# Patient Record
Sex: FEMALE | Race: NATIVE HAWAIIAN OR OTHER PACIFIC ISLANDER
[De-identification: names, ages, dates, MRNs, and addresses within clinical notes are randomized per-mention and may not be internally consistent; named-entity substitution may affect disease eponyms.]

---

## 2018-07-15 ENCOUNTER — HOSPITAL ENCOUNTER (INPATIENT)
Dept: HOSPITAL 31 - C.ER | Age: 48
LOS: 8 days | Discharge: HOME | DRG: 181 | End: 2018-07-23
Attending: FAMILY MEDICINE | Admitting: FAMILY MEDICINE
Payer: COMMERCIAL

## 2018-07-15 VITALS — BODY MASS INDEX: 19 KG/M2

## 2018-07-15 DIAGNOSIS — D47.3: ICD-10-CM

## 2018-07-15 DIAGNOSIS — G47.9: ICD-10-CM

## 2018-07-15 DIAGNOSIS — Z17.1: ICD-10-CM

## 2018-07-15 DIAGNOSIS — R53.83: ICD-10-CM

## 2018-07-15 DIAGNOSIS — R09.02: ICD-10-CM

## 2018-07-15 DIAGNOSIS — Z80.41: ICD-10-CM

## 2018-07-15 DIAGNOSIS — C50.919: ICD-10-CM

## 2018-07-15 DIAGNOSIS — R00.0: ICD-10-CM

## 2018-07-15 DIAGNOSIS — C78.00: ICD-10-CM

## 2018-07-15 DIAGNOSIS — J91.0: Primary | ICD-10-CM

## 2018-07-15 LAB
ALBUMIN SERPL-MCNC: 4.1 G/DL (ref 3.5–5)
ALBUMIN/GLOB SERPL: 1.1 {RATIO} (ref 1–2.1)
ALT SERPL-CCNC: 35 U/L (ref 9–52)
APTT BLD: 28 SECONDS (ref 21–34)
ARTERIAL BLOOD GAS O2 SAT: 97.5 % (ref 95–98)
ARTERIAL BLOOD GAS PCO2: 47 MM/HG (ref 35–45)
ARTERIAL BLOOD GAS TCO2: 34.8 MMOL/L (ref 22–28)
ARTERIAL PATENCY WRIST A: (no result)
AST SERPL-CCNC: 46 U/L (ref 14–36)
BASOPHILS # BLD AUTO: 0.1 K/UL (ref 0–0.2)
BASOPHILS NFR BLD: 1.2 % (ref 0–2)
BNP SERPL-MCNC: 1960 PG/ML (ref 0–450)
BUN SERPL-MCNC: 3 MG/DL (ref 7–17)
CALCIUM SERPL-MCNC: 9.3 MG/DL (ref 8.6–10.4)
EOSINOPHIL # BLD AUTO: 0.6 K/UL (ref 0–0.7)
EOSINOPHIL NFR BLD: 7.5 % (ref 0–4)
ERYTHROCYTE [DISTWIDTH] IN BLOOD BY AUTOMATED COUNT: 13.1 % (ref 11.5–14.5)
GFR NON-AFRICAN AMERICAN: > 60
HCO3 BLDA-SCNC: 31.4 MMOL/L (ref 21–28)
HGB BLD-MCNC: 13 G/DL (ref 11–16)
INR PPP: 1.1
LYMPHOCYTES # BLD AUTO: 1.5 K/UL (ref 1–4.3)
LYMPHOCYTES NFR BLD AUTO: 18.7 % (ref 20–40)
MCH RBC QN AUTO: 29.1 PG (ref 27–31)
MCHC RBC AUTO-ENTMCNC: 33.9 G/DL (ref 33–37)
MCV RBC AUTO: 85.8 FL (ref 81–99)
MONOCYTES # BLD: 0.7 K/UL (ref 0–0.8)
MONOCYTES NFR BLD: 9.5 % (ref 0–10)
NEUTROPHILS # BLD: 5 K/UL (ref 1.8–7)
NEUTROPHILS NFR BLD AUTO: 63.1 % (ref 50–75)
NRBC BLD AUTO-RTO: 0 % (ref 0–2)
PH BLDA: 7.46 [PH] (ref 7.35–7.45)
PLATELET # BLD: 608 K/UL (ref 130–400)
PMV BLD AUTO: 6.2 FL (ref 7.2–11.7)
PO2 BLDA: 79 MM/HG (ref 80–100)
PROTHROMBIN TIME: 12.2 SECONDS (ref 9.7–12.2)
RBC # BLD AUTO: 4.45 MIL/UL (ref 3.8–5.2)
WBC # BLD AUTO: 7.9 K/UL (ref 4.8–10.8)

## 2018-07-15 RX ADMIN — ALBUTEROL SULFATE SCH MG: 2.5 SOLUTION RESPIRATORY (INHALATION) at 20:24

## 2018-07-15 RX ADMIN — PROMETHAZINE HYDROCHLORIDE AND CODEINE PHOSPHATE PRN ML: 6.25; 1 SOLUTION ORAL at 23:39

## 2018-07-15 RX ADMIN — ENOXAPARIN SODIUM SCH MG: 30 INJECTION SUBCUTANEOUS at 22:23

## 2018-07-15 NOTE — CT
CT chest pulmonary angiogram 



History: Shortness of breath.  History of metastatic cancer. 



Comparison: None available. 



Technique: Multiple contiguous axial images were performed through 

the chest utilizing pulmonary embolism protocol with the use of 

intravenous contrast.  Subsequently, sagittal coronal reformatted 

images as well sagittal coronal MIPS reformatted images were 

obtained. 



This CT exam was performed using one or more of the following dose 

reduction techniques: Automated exposure control, adjustment of the 

mA and/or kV according to patient size, and/or use of iterative 

reconstruction technique. 



Findings: 



No evidence of acute pulmonary embolism. 



No evidence acute aortic dissection. 



Right lung: 



Moderate to large loculated right pleural effusion.  Associated 

lobulated pleural thickening circumferentially involving the right 

katlyn thorax as well as the fissures suggestive for pleural metastatic 

disease. Innumerable pulmonary masses throughout the right lung 

consistent with metastatic disease. For example at the right lung 

apex measuring 2.1 centimeters, right upper lobe measuring 8 

millimeters more inferiorly within the right upper lobe measuring 1.4 

centimeters. Additional pulmonary masses seen within the right middle 

and lower lobes. Focal consolidation and/or atelectasis within a 

large portion of right middle and lower lobes with some minimal 

aeration in the right lower lobe noted. 



Left lung: Small lobulated left pleural effusion with lobulated 

pleural thickening along the posterior aspect of the left lower lobe 

also concerning for pleural metastatic disease. Multiple 

pleural-based masses along the left katlyn thorax for example within 

the left upper lobe laterally measuring up to 1.7 centimeters 

concerning for metastatic disease. Multiple pulmonary masses some of 

which are pleural-based seen throughout the left lung consistent with 

known metastatic disease. Innumerable pulmonary nodules and smaller 

pulmonary masses also noted throughout the lungs. Prominent mass for 

example is noted at the left upper lobe anteriorly measuring 2.1 

centimeters on series 4, image 28 at the level of the prevascular 

space. There is tumor encasement of the left main pulmonary artery as 

well as encasement of the segmental vessels with tumor burden. 

Prominent consolidation and/or atelectasis of the inferior left upper 

lobe and lingula. Additional prominent consolidation in the posterior 

left lower lobe.  Multiple pulmonary mass is noted within the left 

lower lobe. 



Trachea thru central airways are patent. 



No significant axillary adenopathy. 



Heterogeneity of the thyroid. 



Left paratracheal lymphadenopathy measures 3.8 centimeters. Tumor 

encasement of the left main pulmonary artery and branch vessels. 

Right hilar adenopathy measures 1.5 centimeters. 



Multiple low-attenuation foci/lesions throughout the liver with 

intrahepatic biliary ductal dilatation.  Clinical correlation. 

Nodular thickening of the adrenal glands. 



Degenerative changes in the spine. 



Impression: 



No evidence of acute pulmonary embolism. 



Extensive metastatic disease within the lung fields as described 

above. 



Multiple areas of consolidation as described above in both lungs. 



Bilateral pleural effusions larger on the right. 



Prominent lymphadenopathy throughout the mediastinum with prominent 

tumor encasement of the left main pulmonary artery and branch 

vessels. 



Additional findings as above.

## 2018-07-15 NOTE — CP.PCM.HP
History of Present Illness





- History of Present Illness


History of Present Illness: 


> Pt had called emergently to be seen or prescribed abx for a cough that's been 

going on for about a month. Pt was coughing uncontrollably when seen. 

Auscultation showed wheezing in multiple places. Gave a trial of an inhaled 

steroid with a long acting beta-agonist. Pt felt relief in a few minutes, and 

had stopped coughing enough that she was able to tell her story. She had 

finished a bottle of Mucinex without relief in the last month. She had also 

been using her Ventolin which she had brought in at my request. I instructed pt 

on the correct way of using the inhaler, as she was not inhaling the medicine, 

which stayed in her mouth and subsequently released with the next exhalation. 


> Used the see 1, do 1 maxim using the inhaler, and pt felt immediate relief. 


> Because of the length of patient's illness, suggested that pt obtain a CXR to 

r/o pneumonia. Pt agreed to get it the same afternoon.


> Received a call the following day around lunchtime, pt's 2 view xray showed 

possible malignancy especially in the upper lobes of both lungs. Called pt's 

oncologist within a few minutes and advised him of the results of the CXR and 

forwarded the report to him the same day, 5/9/2018.


> Having discharged my task and turned her care to her oncologist, i was called 

in again to her case after she had a thoracentesis that resulted in severe pain 

for patient. Since I had received no updates from Oncology, plan was to try and 

stage while in patient. Unbeknownst to me, pt was scheduled for biopsy with IR 

the following day. No fasting schedule issued nor preliminary bloodwork 

ordered. When things were finally straightened outI had already gotten the gist 

of what was missing in the work up and proceeded to get it scheduled and done. 

Pathology came out 6 days later, and I obtained a copy of it for pt the 

following morning after she informed me the results were out. I scheduled also 

a face to face meeting the following Monday with patient and her family, where 

we discussed possible options for her treatment, which would probably be 

palliative at the most. Hence, pt is aware that her condition is without cure 

at the present time, and no protocol exists for treating it, as far as i can 

surmise. 


> This morning, I was called by the pt's daughter who reported that they couldn'

t get her O2 sat above 90, and I advised them to bring her to this facility for 

further management and so that things could be set up to provide for pt's 

comfort at home, as well as get a 2nd opinion on other outstanding issues that 

can hopefully make pt more cofortable in her last days. 





Present on Admission





- Present on Admission


Any Indicators Present on Admission: No


History of DVT/PE: No


History of Uncontrolled Diabetes: No


Urinary Catheter: No


Decubitus Ulcer Present: No





Review of Systems





- Review of Systems


Systems not reviewed;Unavailable: Respiratory Distress


Review of Systems: 





above and coughing





- Breasts


Breasts: Other


Additional comments: 





s/p mastectomy, R





- Respiratory


Respiratory: Cough, Dyspnea, Pain with Coughing, Other


Additional comments: 





blood tinged-sputum   





Past Patient History





- Past Medical History & Family History


Past Medical History?: Yes





- Past Social History


Smoking Status: Never Smoked





- PULMONARY


Hx Pulmonary Edema: Yes





- HEMATOLOGICAL/ONCOLOGICAL


Hx Cancer: Yes


Other/Comment: Breast CA and R lumpectomy/radiation





- MUSCULOSKELETAL/RHEUMATOLOGICAL


Hx Falls: No





- GASTROINTESTINAL


Hx Gall Bladder Disease: Yes


Other/Comment: Cholecystectomy





- GENITOURINARY/GYNECOLOGICAL


Other/Comment: Hysterectomy d/t endometriosis





- PSYCHIATRIC


Hx Substance Use: No





- SURGICAL HISTORY


Hx Appendectomy: Yes (in [facility])


Hx Cholecystectomy: Yes (in native [place])


Hx Hysterectomy: Yes


Other/Comment: R lumpectomy





- ANESTHESIA


Hx Anesthesia: Yes


Hx Anesthesia Reactions: No


Hx Malignant Hyperthermia: No


Has any member of the family had a problem w/ anesthesia?: No





Meds


Allergies/Adverse Reactions: 


 Allergies











Allergy/AdvReac Type Severity Reaction Status Date / Time


 


simvastatin Allergy Severe SWELLING Verified 07/15/18 12:00














Physical Exam





- Constitutional


Appears: In Acute Distress


Additional comments: 





speaking in phrases





- Head Exam


Head Exam: ATRAUMATIC, NORMAL INSPECTION, NORMOCEPHALIC





- Eye Exam


Eye Exam: EOMI, Normal appearance


Pupil Exam: NORMAL ACCOMODATION, PERRL





- ENT Exam


ENT Exam: absent: Mucous Membranes Dry, Mucous Membranes Moist, Normal Exam, 

Normal External Ear Exam, Normal Oropharynx, TM's Normal Bilaterally





- Neck Exam


Additional comments: 





+ JVD





- Respiratory Exam


Respiratory Exam: Prolonged Expiratory Phase, Wheezes, Respiratory Distress





- Cardiovascular Exam


Cardiovascular Exam: Tachycardia, REGULAR RHYTHM





- GI/Abdominal Exam


GI & Abdominal Exam: Normal Bowel Sounds





- Rectal Exam


Rectal Exam: Deferred





- Extremities Exam


Extremities exam: Positive for: normal inspection





- Back Exam


Back exam: NORMAL INSPECTION





- Neurological Exam


Neurological exam: Alert





- Psychiatric Exam


Psychiatric exam: Depressed, Normal Mood





Results





- Vital Signs


Recent Vital Signs: 





 Last Vital Signs











Temp  98.3 F   07/15/18 12:08


 


Pulse  113 H  07/15/18 20:24


 


Resp  22   07/15/18 18:32


 


BP  98/53 L  07/15/18 16:33


 


Pulse Ox  96   07/15/18 18:32














- Labs


Result Diagrams: 


 07/15/18 13:04





 07/15/18 13:04


Labs: 





 Laboratory Results - last 24 hr











  07/15/18 07/15/18 07/15/18





  13:04 13:04 13:04


 


WBC  7.9  


 


RBC  4.45  


 


Hgb  13.0  


 


Hct  38.2  


 


MCV  85.8  


 


MCH  29.1  


 


MCHC  33.9  


 


RDW  13.1  


 


Plt Count  608 H  


 


MPV  6.2 L  


 


Neut % (Auto)  63.1  


 


Lymph % (Auto)  18.7 L  


 


Mono % (Auto)  9.5  


 


Eos % (Auto)  7.5 H  


 


Baso % (Auto)  1.2  


 


Neut # (Auto)  5.0  


 


Lymph # (Auto)  1.5  


 


Mono # (Auto)  0.7  


 


Eos # (Auto)  0.6  


 


Baso # (Auto)  0.1  


 


PT   12.2 


 


INR   1.1 


 


APTT   28 


 


Puncture Site   


 


pCO2   


 


pO2   


 


HCO3   


 


ABG pH   


 


ABG Total CO2   


 


ABG O2 Saturation   


 


ABG Base Excess   


 


Suraj Test   


 


ABG Potassium   


 


Glucose   


 


Lactate   


 


Liter Flow   


 


Sodium    132


 


Potassium    4.5


 


Chloride    85 L


 


Carbon Dioxide    37 H


 


Anion Gap    14


 


BUN    3 L


 


Creatinine    0.4 L


 


Est GFR ( Amer)    > 60


 


Est GFR (Non-Af Amer)    > 60


 


Random Glucose    114 H


 


Calcium    9.3


 


Total Bilirubin    0.6


 


AST    46 H


 


ALT    35


 


Alkaline Phosphatase    129 H


 


Troponin I    < 0.0120


 


NT-Pro-B Natriuret Pep    1960 H


 


Total Protein    7.9


 


Albumin    4.1


 


Globulin    3.8


 


Albumin/Globulin Ratio    1.1


 


Arterial Blood Potassium   














  07/15/18





  13:57


 


WBC 


 


RBC 


 


Hgb 


 


Hct 


 


MCV 


 


MCH 


 


MCHC 


 


RDW 


 


Plt Count 


 


MPV 


 


Neut % (Auto) 


 


Lymph % (Auto) 


 


Mono % (Auto) 


 


Eos % (Auto) 


 


Baso % (Auto) 


 


Neut # (Auto) 


 


Lymph # (Auto) 


 


Mono # (Auto) 


 


Eos # (Auto) 


 


Baso # (Auto) 


 


PT 


 


INR 


 


APTT 


 


Puncture Site  Lra


 


pCO2  47 H


 


pO2  79 L


 


HCO3  31.4 H


 


ABG pH  7.46 H


 


ABG Total CO2  34.8 H


 


ABG O2 Saturation  97.5


 


ABG Base Excess  8.3 H


 


Suraj Test  Pos


 


ABG Potassium  3.5 L


 


Glucose  101


 


Lactate  1.5


 


Liter Flow  2.0


 


Sodium  133.0


 


Potassium 


 


Chloride  96.0 L


 


Carbon Dioxide 


 


Anion Gap 


 


BUN 


 


Creatinine 


 


Est GFR ( Amer) 


 


Est GFR (Non-Af Amer) 


 


Random Glucose 


 


Calcium 


 


Total Bilirubin 


 


AST 


 


ALT 


 


Alkaline Phosphatase 


 


Troponin I 


 


NT-Pro-B Natriuret Pep 


 


Total Protein 


 


Albumin 


 


Globulin 


 


Albumin/Globulin Ratio 


 


Arterial Blood Potassium  3.5 L














Assessment & Plan


(1) Metastatic breast cancer


Assessment and Plan: 


revisit options for treatment, if any, including clinical trials for triple 

negative breast CA


Status: Acute   





(2) Dyspnea


Assessment and Plan: 


and Hypoxemia: consult Heme/Onc Dr. Nilo Quiroz. Discussed primarily lung mets 

surrounding the left brnchus. Broached topiuc to Heme/POnc if a possible 

palliative treatment to ease pt's hypoxia and tachypnea. Latter to discuss with 

Radiation Oncology                                                             

            


Status: Acute   





(3) Hypoxemia


Status: Acute   





(4) Fatigue due to sleep pattern disturbance


Assessment and Plan: 


2ndry to choking of air inflow by mets surrounding L bronchus


Status: Acute   





(5) Cough


Assessment and Plan: 


cough reflex working as expected in response to hypoxemia


Status: Acute   





Decision To Admit





- Pt Status Changed To:


Hospital Disposition Of: Inpatient





- Admit Certification


Admit to Inpatient:: After my assessment, the patient will require 

hospitalization for at least two midnights.  This is because of the severity of 

symptoms shown, intensity of services needed, and/or the medical risk in this 

patient being treated as an outpatient.





- InPatient:


Physician Admission Certification:: After my assessment, the patient will 

require hospitalization for at least two midnights.  This is because of the 

severity of symptoms shown, intensity of services needed, and/or the medical 

risk in this patient being treated as an outpatient.





- .


Bed Request Type: Telemetry

## 2018-07-15 NOTE — C.PDOC
History Of Present Illness





<Faheem Cervantes - Last Filed: 07/15/18 20:59>





<Nathalia Ramos - Last Filed: 18 09:07>


47 year old female presents to the ED complaining of new onset shortness of 

breath and hypoxia since this morning. She has a history of metastatic breast 

cancer, currently pending finding clinical trial for her cancer. New onset of 

bilateral leg swelling. Denies chest pain, fever, or leg pain. Patient has a 

history of chronic cough. 








NEW ONSET SOB, HYPOXIA SINCE THIS MORNING. HO MET BREAST CA, CURRENTLY PENDING 

FINDING CLIN TRIAL FOR HER CANCER. NEW ONSET B/L LEG SWELL. NO CP. HO CHRONIC 

COUGH. NO FEVER. NO LEG PAIN





EXAM


MILD DIST NONTOXIC


LUNGS R>L BASILAR RALES TACHYPNEA SPEAKING FULL SENTENCES


CV RRR TACHY


1+PITTING EDEMA B/L


REMAINDER NEG (Nathalia Ramos)





<Faheem Cervantes - Last Filed: 07/15/18 20:59>


History Per: Patient


History/Exam Limitations: no limitations


Onset/Duration Of Symptoms: Hrs


Current Symptoms Are (Timing): Still Present


Associated Symptoms: Ankle/Leg Swelling.  denies: Fever, Chest Pain





<Nathalia Ramos - Last Filed: 18 09:07>


Time Seen by Provider: 07/15/18 12:25


Chief Complaint (Nursing): Shortness Of Breath





Past Medical History


Reviewed: Historical Data, Nursing Documentation, Vital Signs





- Medical History


Other PMH: Metastatic breast cancer


Surgical History: Appendectomy (in [place]), Cholecystectomy (in 

[facility])


Family History: States: Unknown Family Hx





- Social History


Hx Alcohol Use: No


Hx Substance Use: No





- Immunization History


Hx Tetanus Toxoid Vaccination: No


Hx Influenza Vaccination: No


Hx Pneumococcal Vaccination: No





<Nathalia Ramos - Last Filed: 18 09:07>


Vital Signs: 


 Last Vital Signs











Temp  98.2 F   18 07:30


 


Pulse  105 H  18 08:29


 


Resp  18   18 07:30


 


BP  98/63 L  18 07:30


 


Pulse Ox  98   18 07:30














Review Of Systems


Except As Marked, All Systems Reviewed And Found Negative.


Constitutional: Negative for: Fever


Cardiovascular: Positive for: Edema (Bilateral leg swelling )


Respiratory: Positive for: Shortness of Breath, Other (Hypoxia )


Musculoskeletal: Negative for: Leg Pain





<Nathalia Ramos - Last Filed: 18 09:07>





Physical Exam





- Physical Exam


Appears: Non-toxic, Other (Mild distress)


Skin: Normal Color, Warm, Dry


Head: Atraumatic, Normacephalic


Eye(s): bilateral: Normal Inspection


Nose: Normal


Oral Mucosa: Moist


Neck: Supple


Chest: Symmetrical


Cardiovascular: Edema (1+PITTING EDEMA B/L), Other (RRR, Tachycardic )


Respiratory: Rales (R>L BASILAR RALES), Other ( TACHYPNEA, SPEAKING FULL 

SENTENCES)


Neurological/Psych: Oriented x3, Normal Speech


Gait: Steady





<Nathalia Ramos - Last Filed: 18 09:07>





ED Course And Treatment





- Laboratory Results


Result Diagrams: 


 07/15/18 13:04





 07/15/18 13:04





<Faheem Cervantes - Last Filed: 07/15/18 20:59>





- Laboratory Results


Result Diagrams: 


 07/15/18 13:04





 07/15/18 13:04


ECG: Interpreted By Me, Viewed By Me


ECG Rhythm: Sinus Tachycardia


Rate From EC


O2 Sat by Pulse Oximetry: 90 (RA)


Pulse Ox Interpretation: Normal





- CT Scan/US


  ** CT Chest


Other Rad Studies (CT/US): Read By Radiologist, Radiology Report Reviewed


CT/US Interpretation: Accession No. : [id number].  Patient Name / ID : 

PAULY SAGASTUME  / [id number].  Exam Date : 07/15/2018 15:53:04 ( Approved )

.  Study Comment :  Sex / Age : F  / 47Y.  Creator : Bettie Ledesma.  

Dictator : Nile Lopez MD.   :  Approver : Nile Lopez MD.  Approver2 :  Report Date : 07/15/2018 15:59:54.  My Comment :  ************

***********************************************************************.  CT 

chest pulmonary angiogram.  History: Shortness of breath.  History of 

metastatic cancer.  Comparison: None available.  Technique: Multiple contiguous 

axial images were performed through the chest utilizing pulmonary embolism 

protocol with the use of intravenous contrast.  Subsequently, sagittal coronal 

reformatted images as well sagittal coronal MIPS reformatted images were 

obtained.  This CT exam was performed using one or more of the following dose 

reduction techniques: Automated exposure control, adjustment of the mA and/or 

kV according to patient size, and/or use of iterative reconstruction technique.

  Findings:  No evidence of acute pulmonary embolism.  No evidence acute aortic 

dissection.  Right lung:  Moderate to large loculated right pleural effusion.  

Associated lobulated pleural thickening circumferentially involving the right 

katlyn thorax as well as the fissures suggestive for pleural metastatic disease. 

Innumerable pulmonary masses throughout the right lung consistent with 

metastatic disease. For example at the right lung apex measuring 2.1 centimeters

, right upper lobe measuring 8 millimeters more inferiorly within the right 

upper lobe measuring 1.4 centimeters. Additional pulmonary masses seen within 

the right middle and lower lobes. Focal consolidation and/or atelectasis within 

a large portion of right middle and lower lobes with some minimal aeration in 

the right lower lobe noted.  Left lung: Small lobulated left pleural effusion 

with lobulated pleural thickening along the posterior aspect of the left lower 

lobe also concerning for pleural metastatic disease. Multiple pleural-based 

masses along the left katlyn thorax for example within the left upper lobe 

laterally measuring up to 1.7 centimeters concerning for metastatic disease. 

Multiple pulmonary masses some of which are pleural-based seen throughout the 

left lung consistent with known metastatic disease. Innumerable pulmonary 

nodules and smaller pulmonary masses also noted throughout the lungs. Prominent 

mass for example is noted at the left upper lobe anteriorly measuring 2.1 

centimeters on series 4, image 28 at the level of the prevascular space. There 

is tumor encasement of the left main pulmonary artery as well as encasement of 

the segmental vessels with tumor burden. Prominent consolidation and/or 

atelectasis of the inferior left upper lobe and lingula. Additional prominent 

consolidation in the posterior left lower lobe.  Multiple pulmonary mass is 

noted within the left lower lobe.  Trachea thru central airways are patent.  No 

significant axillary adenopathy.  Heterogeneity of the thyroid.  Left 

paratracheal lymphadenopathy measures 3.8 centimeters. Tumor encasement of the 

left main pulmonary artery and branch vessels. Right hilar adenopathy measures 

1.5 centimeters.  Multiple low-attenuation foci/lesions throughout the liver 

with intrahepatic biliary ductal dilatation.  Clinical correlation. Nodular 

thickening of the adrenal glands.  Degenerative changes in the spine.  

Impression:  No evidence of acute pulmonary embolism.  Extensive metastatic 

disease within the lung fields as described above.  Multiple areas of 

consolidation as described above in both lungs.  Bilateral pleural effusions 

larger on the right.  Prominent lymphadenopathy throughout the mediastinum with 

prominent tumor encasement of the left main pulmonary artery and branch 

vessels.  Additional findings as above.


Progress Note: Patient assessed and examined. CT angio chest ordered and 

reviewed. EKG and CXR ordered and reviewed by me. Patient given Lovenox 70mg 

SC. Labs ordered.





<RichardNathalia - Last Filed: 18 09:07>





Progress





<Faheem Cervantes - Last Filed: 07/15/18 20:59>





- Data Reviewed


Data Reviewed: Lab, Diagnostic imaging, EKG, Old records





- Critical Care


Citical Care: Excluding Proc Time


Critical Care Time: 90 minutes





- Continuity of Care


Discussed patient case with:: Patient, Family-HIPPA compliant, PMD





<Nathalia Ramos - Last Filed: 18 09:07>





- Re-Evaluation


Re-evaluation Note: 





07/15/18 16:46


EXAM UNCH. PERSIST HYPOXIA





D/W DR AMRIT CERVANTES WILL ADMIT (Nathalia Ramos)





Disposition





<Faheem Cervantes - Last Filed: 07/15/18 20:59>


Counseled Patient/Family Regarding: Studies Performed, Diagnosis





- Disposition


Disposition Time: 16:47





- POA


Present On Arrival: None





<RichardNathalia - Last Filed: 18 09:07>





- Disposition


Disposition: HOSPITALIZED


Condition: STABLE





- Clinical Impression


Clinical Impression: 


 Hypoxemia, Dyspnea, Metastatic cancer








<Faheem Cervantes - Last Filed: 07/15/18 20:59>





- Scribe Statement


The provider has reviewed the documentation as recorded by the Scribe





<RichardNathalia - Last Filed: 18 09:07>





- Scribe Statement


Bijal Gonzalez








All medical record entries made by the Scribe were at my direction and 

personally dictated by me. I have reviewed the chart and agree that the record 

accurately reflects my personal performance of the history, physical exam, 

medical decision making, and the department course for this patient. I have 

also personally directed, reviewed, and agree with the discharge instructions 

and disposition. (Nathalia Ramos)





Decision To Admit





<Faheem Cervantes - Last Filed: 07/15/18 20:59>





- Pt Status Changed To:


Hospital Disposition Of: Inpatient





- Admit Certification


Admit to Inpatient:: After my assessment, the patient will require 

hospitalization for at least two midnights.  This is because of the severity of 

symptoms shown, intensity of services needed, and/or the medical risk in this 

patient being treated as an outpatient.





- InPatient:


Physician Admission Certification: I certify that this patient requires 2 or 

more midnights of care for the following reason:: SEE NOTE





- .


Bed Request Type: Telemetry


Admitting Physician: Faheem Cervantes





<Nathalia Ramos - Last Filed: 18 09:07>





- .


Patient Diagnosis: 


 Hypoxemia, Dyspnea, Metastatic cancer

## 2018-07-15 NOTE — RAD
Chest x-ray single frontal view 



History: Shortness of breath.  History of metastatic cancer. 



Comparison: 07/15/2018 



Findings: 



Moderate right and small left pleural effusion. 



Prominent focal consolidative changes seen at the right lung base and 

to a lesser extent left lung base. 



Nodular masslike opacity seen within the right mid lung zone. This 

may represent underlying metastatic disease. 



Prominent lobulated pleural thickening along the right katlyn thorax 

from the right lung apex to the right lung base. 



Lobulated pleural density along the left lung apex. 



Bilateral hilar prominence. 



Cardiomegaly. 



Degenerative changes in the spine. 



Impression: 



Moderate right and small left pleural effusion. 



Prominent focal consolidative changes seen at the right lung base and 

to a lesser extent left lung base. 



Nodular masslike opacity seen within the right mid lung zone. This 

may represent underlying metastatic disease. 



Prominent lobulated pleural thickening along the right katlyn thorax 

from the right lung apex to the right lung base. 



Lobulated pleural density along the left lung apex. 



Bilateral hilar prominence. 



Cardiomegaly.

## 2018-07-16 RX ADMIN — ALBUTEROL SULFATE SCH MG: 2.5 SOLUTION RESPIRATORY (INHALATION) at 13:43

## 2018-07-16 RX ADMIN — PROMETHAZINE HYDROCHLORIDE AND CODEINE PHOSPHATE PRN ML: 6.25; 1 SOLUTION ORAL at 03:48

## 2018-07-16 RX ADMIN — ALBUTEROL SULFATE SCH MG: 2.5 SOLUTION RESPIRATORY (INHALATION) at 19:09

## 2018-07-16 RX ADMIN — PROMETHAZINE HYDROCHLORIDE AND DEXTROMETHORPHAN HYDROBROMIDE SCH ML: 15; 6.25 SYRUP ORAL at 20:45

## 2018-07-16 RX ADMIN — ENOXAPARIN SODIUM SCH MG: 30 INJECTION SUBCUTANEOUS at 22:36

## 2018-07-16 RX ADMIN — ALBUTEROL SULFATE SCH MG: 2.5 SOLUTION RESPIRATORY (INHALATION) at 03:15

## 2018-07-16 RX ADMIN — ALBUTEROL SULFATE SCH: 2.5 SOLUTION RESPIRATORY (INHALATION) at 00:22

## 2018-07-16 RX ADMIN — ALBUTEROL SULFATE SCH: 2.5 SOLUTION RESPIRATORY (INHALATION) at 16:05

## 2018-07-16 RX ADMIN — ALBUTEROL SULFATE SCH MG: 2.5 SOLUTION RESPIRATORY (INHALATION) at 08:11

## 2018-07-16 RX ADMIN — ENOXAPARIN SODIUM SCH MG: 30 INJECTION SUBCUTANEOUS at 09:33

## 2018-07-16 NOTE — CP.PCM.CON
History of Present Illness





- History of Present Illness


History of Present Illness: 





47 year old female with a history of stage IV breast cancer (triple negative) 

to the lymph nodes and lung, presenting with worsening shortness of breath, 

cough, and hypoxia.  The patient is awaiting treatment on a clinical trial (

chemotherapy + parp inhibitor at [facility]) and has an appointment as [facility] for 

immunotherapy trial consideration.  She was initially diagnosed with localized 

breast cancer and treated with neoadjuvant chemotherapy, followed by lumpectomy

, and adjuvant radiotherapy.  She was following with her oncologist Dr. Mike 

who sw her in April for her breathing complications.  A percutaneous biopsy was 

done at [facility] which confirmed recurrent and metastatic breast cancer.  





She currently notes to progressive shortness of breath and cough.  She denies 

fevers and chills.  She does have chest pain with deep breathing.  A CT angio 

of the chest was negative for PE.  





Past medical history:  Stage IV breast cancer





Past surgical history:  Lumpectomy, port insertion and removal, appendectomy, 

cholecystectomy, hysterectomy





Family history:  Aunt had ovarian cancer in her 50s





Social history:  Denies tobacco, alcohol, and illicit drug use.





Allergies:  Simvastatin





Review of systems:  All remaining review of systems including HEENT, 

cardiovascular, respiratory, gastrointestinal, genitourinary, musculoskeletal, 

dermatologic, neurologic, and psychiatric are negative unless mentioned in the 

HPI





Past Patient History





- Past Medical History & Family History


Past Medical History?: Yes





- Past Social History


Smoking Status: Never Smoked





- PULMONARY


Hx Pulmonary Edema: Yes





- HEMATOLOGICAL/ONCOLOGICAL


Hx Cancer: Yes


Other/Comment: Breast CA and R lumpectomy/radiation





- MUSCULOSKELETAL/RHEUMATOLOGICAL


Hx Falls: No





- GASTROINTESTINAL


Hx Gall Bladder Disease: Yes


Other/Comment: Cholecystectomy





- GENITOURINARY/GYNECOLOGICAL


Other/Comment: Hysterectomy d/t endometriosis





- PSYCHIATRIC


Hx Substance Use: No





- SURGICAL HISTORY


Hx Appendectomy: Yes (in [facility])


Hx Cholecystectomy: Yes (in native [place])


Hx Hysterectomy: Yes


Other/Comment: R lumpectomy





- ANESTHESIA


Hx Anesthesia: Yes


Hx Anesthesia Reactions: No


Hx Malignant Hyperthermia: No


Has any member of the family had a problem w/ anesthesia?: No





Meds


Allergies/Adverse Reactions: 


 Allergies











Allergy/AdvReac Type Severity Reaction Status Date / Time


 


simvastatin Allergy Severe SWELLING Verified 07/15/18 12:00














- Medications


Medications: 


 Current Medications





Acetaminophen (Tylenol 325mg Tab)  650 mg PO Q6 PRN


   PRN Reason: Pain, moderate (4-7)


   Last Admin: 07/16/18 13:04 Dose:  650 mg


Albuterol Sulfate (Albuterol 0.083% Inhal Sol (2.5 Mg/3 Ml) Ud)  2.5 mg INH RQ4 

[facility]LEY


   Last Admin: 07/16/18 19:09 Dose:  2.5 mg


Enoxaparin Sodium (Lovenox)  30 mg SC 1000,2200 AINSLEY


   Last Admin: 07/16/18 09:33 Dose:  30 mg


Morphine Sulfate (Morphine)  2 mg IVP Q3H [facility]


   Last Admin: 07/16/18 20:34 Dose:  Not Given


Morphine Sulfate (Morphine Immediate Release Tab)  15 mg PO Q6 PRN


   PRN Reason: Pain, severe (8-10)


   Stop: 07/19/18 12:43


Pneumococcal Polyvalent Vaccine (Pneumovax 23 Vaccine)  0.5 ml IM .ONCE ONE


   Stop: 07/17/18 12:01


Promethazine HCl/Codeine (Phenergan/Codeine Oral Syrup)  5 ml PO Q4 PRN


   PRN Reason: cns depression


   Last Admin: 07/16/18 03:48 Dose:  5 ml


Promethazine HCl/Dextromethorphan (Phenergan Dm Syrup)  5 ml PO Q6H [facility]


   Last Admin: 07/16/18 20:45 Dose:  5 ml











Physical Exam





- Head Exam


Head Exam: ATRAUMATIC





- Eye Exam


Eye Exam: Normal appearance





- ENT Exam


ENT Exam: Mucous Membranes Dry





- Respiratory Exam


Respiratory Exam: NORMAL BREATHING PATTERN





- Cardiovascular Exam


Cardiovascular Exam: +S1, +S2





- GI/Abdominal Exam


GI & Abdominal Exam: Normal Bowel Sounds





- Extremities Exam


Extremities exam: Positive for: pedal edema





- Neurological Exam


Neurological exam: Oriented x3





- Psychiatric Exam


Psychiatric exam: Normal Affect, Normal Mood





- Skin


Skin Exam: Warm





Results





- Vital Signs


Recent Vital Signs: 


 Last Vital Signs











Temp  98 F   07/16/18 16:00


 


Pulse  102 H  07/16/18 16:48


 


Resp  20   07/16/18 16:00


 


BP  96/65 L  07/16/18 16:00


 


Pulse Ox  98   07/16/18 16:00














- Labs


Result Diagrams: 


 07/15/18 13:04





 07/15/18 13:04





Assessment & Plan


(1) Pleural effusion


Assessment and Plan: 


pulmonary evaluation, thoracentesis evaluation given worsening pulmonary status


likely malignant pleural effusion from breast cancer; recommend cytology 

evaluation if to have thoracentesis





Status: Acute   





(2) Metastatic breast cancer


Assessment and Plan: 


triple negative


will add PD-L1 and MSI testing to pathology from 4/2018 biopsy at [facility] for 

immunotherapy determination


recommend portacath insertion for outpatient treatment; may require 

chemotherapy +/- radiotherapy while awaiting clinical trial/immunotherapy 

determination


outpatient appointment at [facility] for immunotherapy clinical trial discussion this 

Friday





Status: Acute   





(3) Thrombocytosis


Assessment and Plan: 


likely reactive to malignancy; no current treatment need.





Thank you for this interesting consult.


Status: Acute

## 2018-07-16 NOTE — CP.PCM.PN
Subjective





- Date & Time of Evaluation


Date of Evaluation: 07/16/18


Time of Evaluation: 19:02





- Subjective


Subjective: 


Pt seen and examined at bedside.  Awaiting consult on patient who was admitted 

for hypoxemia. In the meantime, working on pt's O2 need at home, and will order 

PT with and without O2 to determine baseline oxygenation status and justify 

insurance requirements for home O2.


Late today, seen by Heme/Onc and recommendations to follow.   





Objective





- Vital Signs/Intake and Output


Vital Signs (last 24 hours): 


 











Temp Pulse Resp BP Pulse Ox


 


 98 F   102 H  20   96/65 L  98 


 


 07/16/18 16:00  07/16/18 16:48  07/16/18 16:00  07/16/18 16:00  07/16/18 16:00











- Medications


Medications: 


 Current Medications





Acetaminophen (Tylenol 325mg Tab)  650 mg PO Q6 PRN


   PRN Reason: Pain, moderate (4-7)


   Last Admin: 07/16/18 13:04 Dose:  650 mg


Albuterol Sulfate (Albuterol 0.083% Inhal Sol (2.5 Mg/3 Ml) Ud)  2.5 mg INH RQ4 

AINSLEY


   Last Admin: 07/16/18 16:05 Dose:  Not Given


Enoxaparin Sodium (Lovenox)  30 mg SC 1000,2200 [facility]


   Last Admin: 07/16/18 09:33 Dose:  30 mg


Morphine Sulfate (Morphine)  2 mg IVP Q3H [facility]


   Last Admin: 07/16/18 09:35 Dose:  Not Given


Morphine Sulfate (Morphine Immediate Release Tab)  15 mg PO Q6 PRN


   PRN Reason: Pain, severe (8-10)


   Stop: 07/19/18 12:43


Pneumococcal Polyvalent Vaccine (Pneumovax 23 Vaccine)  0.5 ml IM .ONCE ONE


   Stop: 07/17/18 12:01


Promethazine HCl/Codeine (Phenergan/Codeine Oral Syrup)  5 ml PO Q4 PRN


   PRN Reason: cns depression


   Last Admin: 07/16/18 03:48 Dose:  5 ml


Promethazine HCl/Dextromethorphan (Phenergan Dm Syrup)  5 ml PO Q6H [facility]











- Labs


Labs: 


 





 07/15/18 13:04 





 07/15/18 13:04 





 











PT  12.2 SECONDS (9.7-12.2)   07/15/18  13:04    


 


INR  1.1   07/15/18  13:04    


 


APTT  28 SECONDS (21-34)   07/15/18  13:04    














- Constitutional


Appears: In Acute Distress (respiratory-wise, with speech in phrases)





- Head Exam


Head Exam: NORMAL INSPECTION





- Eye Exam


Eye Exam: Normal appearance


Pupil Exam: NORMAL ACCOMODATION





- ENT Exam


ENT Exam: Mucous Membranes Dry (2ndry to mouth breathing), Normal Exam





- Neck Exam


Neck Exam: Normal Inspection (? JVD)





- Respiratory Exam


Additional comments: 





+ wheezing L>R





- Cardiovascular Exam


Cardiovascular Exam: REGULAR RHYTHM





- GI/Abdominal Exam


GI & Abdominal Exam: Normal Bowel Sounds





- Rectal Exam


Rectal Exam: Deferred





- Neurological Exam


Neurological Exam: Alert, Awake, CN II-XII Intact, Normal Gait, Oriented x3


Neuro motor strength exam: Left Upper Extremity: 4, Right Upper Extremity: 4, 

Left Lower Extremity: 3, Right Lower Extremity: 3





- Psychiatric Exam


Psychiatric exam: Anxious, Depressed, Normal Mood





- Skin


Skin Exam: Dry, Intact, Normal Color





Assessment and Plan


(1) Metastatic breast cancer


Assessment & Plan: 


stable at this. Awaiting Heme/Onc input as well as Pulmonary. 


Status: Acute   





(2) Dyspnea


Assessment & Plan: 


on neb treatments


Status: Acute   





(3) Hypoxemia


Assessment & Plan: 


on continuous O2


Status: Acute   





(4) Fatigue due to sleep pattern disturbance


Assessment & Plan: 


will start small dose of sedative or hypnotic to promote sleep


Status: Acute   





(5) Cough


Assessment & Plan: 


more from cough reflex 2ndry to inadequate oxygenation bec of pulmonary mets 

with decreased area for aeration


Status: Acute

## 2018-07-17 LAB
ALBUMIN SERPL-MCNC: 3.7 G/DL (ref 3.5–5)
ALBUMIN/GLOB SERPL: 1 {RATIO} (ref 1–2.1)
ALT SERPL-CCNC: 33 U/L (ref 9–52)
APTT BLD: 29 SECONDS (ref 21–34)
ARTERIAL BLOOD GAS HEMOGLOBIN: 11.7 G/DL (ref 11.7–17.4)
ARTERIAL BLOOD GAS O2 SAT: 91.9 % (ref 95–98)
ARTERIAL BLOOD GAS PCO2: 51 MM/HG (ref 35–45)
ARTERIAL BLOOD GAS TCO2: 37.9 MMOL/L (ref 22–28)
ARTERIAL PATENCY WRIST A: (no result)
AST SERPL-CCNC: 45 U/L (ref 14–36)
BASOPHILS # BLD AUTO: 0.1 K/UL (ref 0–0.2)
BASOPHILS NFR BLD: 0.8 % (ref 0–2)
BUN SERPL-MCNC: 7 MG/DL (ref 7–17)
CALCIUM SERPL-MCNC: 8.9 MG/DL (ref 8.6–10.4)
EOSINOPHIL # BLD AUTO: 1.3 K/UL (ref 0–0.7)
EOSINOPHIL NFR BLD: 13 % (ref 0–4)
ERYTHROCYTE [DISTWIDTH] IN BLOOD BY AUTOMATED COUNT: 13 % (ref 11.5–14.5)
GFR NON-AFRICAN AMERICAN: > 60
HCO3 BLDA-SCNC: 33.2 MMOL/L (ref 21–28)
HGB BLD-MCNC: 11.7 G/DL (ref 11–16)
INHALED O2 CONCENTRATION: 21 %
INR PPP: 1.1
LYMPHOCYTES # BLD AUTO: 1.6 K/UL (ref 1–4.3)
LYMPHOCYTES NFR BLD AUTO: 16.6 % (ref 20–40)
MCH RBC QN AUTO: 28.2 PG (ref 27–31)
MCHC RBC AUTO-ENTMCNC: 32.5 G/DL (ref 33–37)
MCV RBC AUTO: 86.8 FL (ref 81–99)
MONOCYTES # BLD: 0.8 K/UL (ref 0–0.8)
MONOCYTES NFR BLD: 8.5 % (ref 0–10)
NEUTROPHILS # BLD: 6 K/UL (ref 1.8–7)
NEUTROPHILS NFR BLD AUTO: 61.1 % (ref 50–75)
NRBC BLD AUTO-RTO: 0.1 % (ref 0–2)
PH BLDA: 7.46 [PH] (ref 7.35–7.45)
PLATELET # BLD: 587 K/UL (ref 130–400)
PMV BLD AUTO: 6 FL (ref 7.2–11.7)
PO2 BLDA: 52 MM/HG (ref 80–100)
PROTHROMBIN TIME: 12 SECONDS (ref 9.7–12.2)
RBC # BLD AUTO: 4.15 MIL/UL (ref 3.8–5.2)
WBC # BLD AUTO: 9.9 K/UL (ref 4.8–10.8)

## 2018-07-17 RX ADMIN — ENOXAPARIN SODIUM SCH MG: 30 INJECTION SUBCUTANEOUS at 09:08

## 2018-07-17 RX ADMIN — PROMETHAZINE HYDROCHLORIDE AND DEXTROMETHORPHAN HYDROBROMIDE SCH: 15; 6.25 SYRUP ORAL at 06:00

## 2018-07-17 RX ADMIN — ALBUTEROL SULFATE SCH MG: 2.5 SOLUTION RESPIRATORY (INHALATION) at 15:58

## 2018-07-17 RX ADMIN — ALBUTEROL SULFATE SCH MG: 2.5 SOLUTION RESPIRATORY (INHALATION) at 13:19

## 2018-07-17 RX ADMIN — ENOXAPARIN SODIUM SCH MG: 30 INJECTION SUBCUTANEOUS at 22:21

## 2018-07-17 RX ADMIN — PROMETHAZINE HYDROCHLORIDE AND DEXTROMETHORPHAN HYDROBROMIDE SCH: 15; 6.25 SYRUP ORAL at 15:30

## 2018-07-17 RX ADMIN — PROMETHAZINE HYDROCHLORIDE AND DEXTROMETHORPHAN HYDROBROMIDE SCH: 15; 6.25 SYRUP ORAL at 19:00

## 2018-07-17 RX ADMIN — ALBUTEROL SULFATE SCH: 2.5 SOLUTION RESPIRATORY (INHALATION) at 00:55

## 2018-07-17 RX ADMIN — ALBUTEROL SULFATE SCH: 2.5 SOLUTION RESPIRATORY (INHALATION) at 04:31

## 2018-07-17 RX ADMIN — PROMETHAZINE HYDROCHLORIDE AND DEXTROMETHORPHAN HYDROBROMIDE SCH: 15; 6.25 SYRUP ORAL at 00:24

## 2018-07-17 RX ADMIN — ALBUTEROL SULFATE SCH MG: 2.5 SOLUTION RESPIRATORY (INHALATION) at 07:54

## 2018-07-17 RX ADMIN — ALBUTEROL SULFATE SCH MG: 2.5 SOLUTION RESPIRATORY (INHALATION) at 19:19
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
## 2018-07-17 NOTE — CP.PCM.PN
Subjective





- Date & Time of Evaluation


Date of Evaluation: 07/17/18


Time of Evaluation: 21:02





- Subjective


Subjective: 





Patient is still having some shortness of breath.


Oxygen saturation is on the low side with room air.


Coughing noted.


No chest pain.


Poor intake noted.





Vital signs stable otherwise.


Chest bilateral good air entry in the upper lung fields, decreased in the lower 

lung fields.


Regular heart sound.


Nontender abdomen.


Edema noted





Patient scheduled to have the Port-A-Cath.


Assessment and recommendation:


47-year-old female with a metastatic breast cancer, bilateral pleural effusion, 

metastatic lung disease.


In my opinion because of the loculated nature of the fluid and the malignant 

nature possibly hemorrhagic I recommended pigtail catheter if possible 

bilaterally to improve the patient the respiration.


Interventional radiology pigtail catheter ordered.


We will speak to IR the morning.





Objective





- Vital Signs/Intake and Output


Vital Signs (last 24 hours): 


 











Temp Pulse Resp BP Pulse Ox


 


 98.7 F   109 H  18   98/60 L  97 


 


 07/17/18 15:00  07/17/18 16:00  07/17/18 15:00  07/17/18 15:00  07/17/18 15:00








Intake and Output: 


 











 07/17/18 07/18/18





 18:59 06:59


 


Intake Total 250 


 


Balance 250 














- Medications


Medications: 


 Current Medications





Acetaminophen (Tylenol 325mg Tab)  650 mg PO Q6 PRN


   PRN Reason: Pain, moderate (4-7)


   Last Admin: 07/17/18 17:36 Dose:  650 mg


Albuterol Sulfate (Albuterol 0.083% Inhal Sol (2.5 Mg/3 Ml) Ud)  2.5 mg INH RQ4 

AINSLEY


   Last Admin: 07/17/18 19:19 Dose:  2.5 mg


Enoxaparin Sodium (Lovenox)  30 mg SC 1000,2200 [facility]


   Last Admin: 07/17/18 09:08 Dose:  30 mg


Morphine Sulfate (Morphine)  2 mg IVP Q3H [facility]


   Last Admin: 07/17/18 16:00 Dose:  Not Given


Morphine Sulfate (Morphine Immediate Release Tab)  15 mg PO Q6 PRN


   PRN Reason: Pain, severe (8-10)


   Stop: 07/19/18 12:43


Promethazine HCl/Codeine (Phenergan/Codeine Oral Syrup)  5 ml PO Q4 PRN


   PRN Reason: cns depression


   Last Admin: 07/16/18 03:48 Dose:  5 ml


Promethazine HCl/Dextromethorphan (Phenergan Dm Syrup)  5 ml PO Q6H [facility]


   Last Admin: 07/17/18 15:30 Dose:  Not Given


Zolpidem Tartrate (Ambien)  5 mg PO HS PRN


   PRN Reason: Insomnia


   Last Admin: 07/17/18 00:48 Dose:  5 mg











- Labs


Labs: 


 





 07/15/18 13:04 





 07/15/18 13:04 





 











PT  12.2 SECONDS (9.7-12.2)   07/15/18  13:04    


 


INR  1.1   07/15/18  13:04    


 


APTT  28 SECONDS (21-34)   07/15/18  13:04

## 2018-07-17 NOTE — CP.PCM.PN
Subjective





- Date & Time of Evaluation


Date of Evaluation: 07/17/18


Time of Evaluation: 13:00





- Subjective


Subjective: 





Has cough, daughter at bedside





Objective





- Vital Signs/Intake and Output


Vital Signs (last 24 hours): 


 











Temp Pulse Resp BP Pulse Ox


 


 98.2 F   109 H  18   112/74   90 L


 


 07/17/18 07:30  07/17/18 16:00  07/17/18 07:30  07/17/18 14:02  07/17/18 09:08








Intake and Output: 


 











 07/17/18 07/17/18





 06:59 18:59


 


Intake Total 100 250


 


Balance 100 250














- Medications


Medications: 


 Current Medications





Acetaminophen (Tylenol 325mg Tab)  650 mg PO Q6 PRN


   PRN Reason: Pain, moderate (4-7)


   Last Admin: 07/17/18 09:07 Dose:  650 mg


Albuterol Sulfate (Albuterol 0.083% Inhal Sol (2.5 Mg/3 Ml) Ud)  2.5 mg INH RQ4 

AINSLEY


   Last Admin: 07/17/18 15:58 Dose:  2.5 mg


Enoxaparin Sodium (Lovenox)  30 mg SC 1000,2200 A[facility]


   Last Admin: 07/17/18 09:08 Dose:  30 mg


Morphine Sulfate (Morphine)  2 mg IVP Q3H [facility]


   Last Admin: 07/17/18 13:59 Dose:  2 mg


Morphine Sulfate (Morphine Immediate Release Tab)  15 mg PO Q6 PRN


   PRN Reason: Pain, severe (8-10)


   Stop: 07/19/18 12:43


Promethazine HCl/Codeine (Phenergan/Codeine Oral Syrup)  5 ml PO Q4 PRN


   PRN Reason: cns depression


   Last Admin: 07/16/18 03:48 Dose:  5 ml


Promethazine HCl/Dextromethorphan (Phenergan Dm Syrup)  5 ml PO Q6H [facility]


   Last Admin: 07/17/18 15:30 Dose:  Not Given


Zolpidem Tartrate (Ambien)  5 mg PO HS PRN


   PRN Reason: Insomnia


   Last Admin: 07/17/18 00:48 Dose:  5 mg











- Labs


Labs: 


 





 07/15/18 13:04 





 07/15/18 13:04 





 











PT  12.2 SECONDS (9.7-12.2)   07/15/18  13:04    


 


INR  1.1   07/15/18  13:04    


 


APTT  28 SECONDS (21-34)   07/15/18  13:04    














- Head Exam


Head Exam: ATRAUMATIC





- Eye Exam


Eye Exam: Normal appearance





- ENT Exam


ENT Exam: Mucous Membranes Dry





- Respiratory Exam


Respiratory Exam: Decreased Breath Sounds





- Cardiovascular Exam


Cardiovascular Exam: +S1, +S2





- GI/Abdominal Exam


GI & Abdominal Exam: Normal Bowel Sounds





- Extremities Exam


Extremities Exam: Pedal Edema





- Neurological Exam


Neurological Exam: Oriented x3





- Psychiatric Exam


Psychiatric exam: Normal Affect, Normal Mood





- Skin


Skin Exam: Warm





Assessment and Plan


(1) Pleural effusion


Assessment & Plan: 


suspect malignant effusion


pulmonary evaluation, recommend thoracentesis and sending pleural fluid for 

cytology





Status: Acute   





(2) Metastatic breast cancer


Assessment & Plan: 


stage IV


triple negative


recommend portacath placement by surgery


outpatient clinical trial evaluation


outpatient f/u for further treatment 








Status: Acute   





(3) Thrombocytosis


Assessment & Plan: 


reactive from malignancy, no treatment required








Status: Acute

## 2018-07-17 NOTE — CP.PCM.CON
History of Present Illness





- History of Present Illness


History of Present Illness: 


47F with PMHx of breast cancer presents to [facility] with recurrent 

breast cancer and metastatic disease. Patient was seen in the ED due to severe 

shortness of breath. She started with persistent coughing since March which she 

thought was due to allergies. Her symptoms worsened up until the onset of her 

shortness of breath. She reports copious, non-bloody sputum when coughing. CT 

chest was taken and revealed recurrence of breast cancer with metastatic 

disease.





PMHx -breast cancer 


PSHx - R lumpectomy, cholecystectomy, hysterectomy, Portocath insertion 


Allergies - simvastatin


Past Hospitalization - Thoracocentesis


Social History - non-smoker, non-drinker, non-drug user








Review of Systems





- Constitutional


Constitutional: absent: Chills, Fever, Night Sweats





- Breasts


Breasts: As Per HPI





- Respiratory


Respiratory: Pain with Coughing





- Gastrointestinal


Gastrointestinal: absent: Abdominal Pain, Bloating, Constipation





- Genitourinary


Genitourinary: absent: Difficulty Urinating, Dysuria, Flank Pain, Hematuria





Past Patient History





- Past Medical History & Family History


Past Medical History?: Yes





- Past Social History


Smoking Status: Never Smoked





- PULMONARY


Hx Pulmonary Edema: Yes





- HEMATOLOGICAL/ONCOLOGICAL


Hx Cancer: Yes


Other/Comment: Breast CA and R lumpectomy/radiation





- MUSCULOSKELETAL/RHEUMATOLOGICAL


Hx Falls: No





- GASTROINTESTINAL


Hx Gall Bladder Disease: Yes


Other/Comment: Cholecystectomy





- GENITOURINARY/GYNECOLOGICAL


Other/Comment: Hysterectomy d/t endometriosis





- PSYCHIATRIC


Hx Substance Use: No





- SURGICAL HISTORY


Hx [place]ectomy: [place] (in [place]


Hx Cholecystectomy: Yes (in [place])





- ANESTHESIA


Hx Anesthesia: Yes


Hx Anesthesia Reactions: No


Hx Malignant Hyperthermia: No


Has any member of the family had a problem w/ anesthesia?: No





Meds


Allergies/Adverse Reactions: 


 Allergies











Allergy/AdvReac Type Severity Reaction Status Date / Time


 


simvastatin Allergy Severe SWELLING Verified 07/15/18 12:00














- Medications


Medications: 


 Current Medications





Acetaminophen (Tylenol 325mg Tab)  650 mg PO Q6 PRN


   PRN Reason: Pain, moderate (4-7)


   Last Admin: 07/17/18 09:07 Dose:  650 mg


Albuterol Sulfate (Albuterol 0.083% Inhal Sol (2.5 Mg/3 Ml) Ud)  2.5 mg INH RQ4 

[facility]


   Last Admin: 07/17/18 15:58 Dose:  2.5 mg


Enoxaparin Sodium (Lovenox)  30 mg SC 1000,2200 [facility]


   Last Admin: 07/17/18 09:08 Dose:  30 mg


Morphine Sulfate (Morphine)  2 mg IVP Q3H [facility]


   Last Admin: 07/17/18 13:59 Dose:  2 mg


Morphine Sulfate (Morphine Immediate Release Tab)  15 mg PO Q6 PRN


   PRN Reason: Pain, severe (8-10)


   Stop: 07/19/18 12:43


Promethazine HCl/Codeine (Phenergan/Codeine Oral Syrup)  5 ml PO Q4 PRN


   PRN Reason: cns depression


   Last Admin: 07/16/18 03:48 Dose:  5 ml


Promethazine HCl/Dextromethorphan (Phenergan Dm Syrup)  5 ml PO Q6H [facility]


   Last Admin: 07/17/18 15:30 Dose:  Not Given


Zolpidem Tartrate (Ambien)  5 mg PO HS PRN


   PRN Reason: Insomnia


   Last Admin: 07/17/18 00:48 Dose:  5 mg











Physical Exam





- Constitutional


Appears: Well, No Acute Distress





- Head Exam


Head Exam: ATRAUMATIC, NORMOCEPHALIC





- Eye Exam


Eye Exam: EOMI





- ENT Exam


ENT Exam: Mucous Membranes Moist





- Respiratory Exam


Respiratory Exam: Chest Wall Tenderness, NORMAL BREATHING PATTERN.  absent: 

Accessory Muscle Use, Decreased Breath Sounds, Prolonged Expiratory Phase





- Cardiovascular Exam


Cardiovascular Exam: REGULAR RHYTHM, +S1, +S2.  absent: Bradycardia, Tachycardia

, Diastolic murmur





- Extremities Exam


Extremities exam: Positive for: normal inspection.  Negative for: calf 

tenderness, joint swelling





- Neurological Exam


Neurological exam: Alert, Normal Gait, Oriented x3





- Psychiatric Exam


Psychiatric exam: Normal Affect, Normal Mood





- Skin


Skin Exam: Dry, Intact, Normal Color





Results





- Vital Signs


Recent Vital Signs: 


 Last Vital Signs











Temp  98.7 F   07/17/18 15:00


 


Pulse  109 H  07/17/18 16:00


 


Resp  18   07/17/18 15:00


 


BP  98/60 L  07/17/18 15:00


 


Pulse Ox  97   07/17/18 15:00














- Labs


Result Diagrams: 


 07/17/18 21:19





 07/17/18 21:19


Labs: 


 Laboratory Results - last 24 hr











  07/17/18





  05:45


 


Puncture Site  L rad


 


pCO2  51 H


 


pO2  52 L


 


HCO3  33.2 H


 


ABG pH  7.46 H


 


ABG Total CO2  37.9 H


 


ABG O2 Saturation  91.9 L


 


ABG Base Excess  10.8 H


 


ABG Hemoglobin  11.7


 


ABG Carboxyhemoglobin  2.1 H


 


POC ABG HHb (Measured)  7.8 H


 


ABG Methemoglobin  1.2


 


Suraj Test  Pos


 


A-a O2 Difference  34.0


 


Respiratory Index  0.7


 


Hgb O2 Saturation  88.9 L


 


Liter Flow  0


 


FiO2  21.0














Assessment & Plan





- Assessment and Plan (Free Text)


Assessment: 


47F presents with recurrent breast cancer with metastatic disease


Plan: 





-Portacath cancelled


-patient going to [facility] for immunotherapy


-No further intervention needed at this time


-Discussed with Dr. Flynn Molina PGY2

## 2018-07-17 NOTE — RAD
Date of service: 



07/17/2018



PROCEDURE:  Chest x-ray (decubitus views)



HISTORY:

quantify pleural effusion and determine benefit vs



COMPARISON:

Chest radiograph performed approximately 20 minutes prior



TECHNIQUE:

AP left and right lateral decubitus views of the chest



FINDINGS:

Previously described moderate to large right and small left pleural 

effusions appear loculated without significant change in 

configuration on either decubitus view.



IMPRESSION:

Loculated moderate to large right and small left pleural effusions.

## 2018-07-17 NOTE — RAD
Date of service: 



07/17/2018



HISTORY:

quantify pleural effusion and determine benefit vs  



COMPARISON:

Chest radiograph dated 07/15/2018.



TECHNIQUE:

Chest PA and lateral



FINDINGS:



LUNGS:

Pulmonary vascular congestion.  Bibasilar atelectasis. Pulmonary 

nodule/ masses seen at the left apex and along the peripheral left 

upper and midlung redemonstrated.



PLEURA:

Moderate right and small left pleural effusions, grossly stable. 

Loculated effusion along the right lateral and apical convexity, 

unchanged. No pneumothorax apparent.



CARDIOVASCULAR:

Cardiomediastinal silhouette unchanged.



OSSEOUS STRUCTURES:

Unchanged.



VISUALIZED UPPER ABDOMEN:

Normal.



OTHER FINDINGS:

None.



IMPRESSION:

Stable appearance of moderate to large loculated right pleural 

effusion and small left pleural effusions. 



Re- demonstration of pulmonary nodules/masses.

## 2018-07-18 PROCEDURE — 5A09557 ASSISTANCE WITH RESPIRATORY VENTILATION, GREATER THAN 96 CONSECUTIVE HOURS, CONTINUOUS POSITIVE AIRWAY PRESSURE: ICD-10-PCS | Performed by: FAMILY MEDICINE

## 2018-07-18 PROCEDURE — 0W9B30Z DRAINAGE OF LEFT PLEURAL CAVITY WITH DRAINAGE DEVICE, PERCUTANEOUS APPROACH: ICD-10-PCS | Performed by: RADIOLOGY

## 2018-07-18 PROCEDURE — 0W9930Z DRAINAGE OF RIGHT PLEURAL CAVITY WITH DRAINAGE DEVICE, PERCUTANEOUS APPROACH: ICD-10-PCS | Performed by: RADIOLOGY

## 2018-07-18 RX ADMIN — PROMETHAZINE HYDROCHLORIDE AND DEXTROMETHORPHAN HYDROBROMIDE SCH: 15; 6.25 SYRUP ORAL at 14:38

## 2018-07-18 RX ADMIN — ALBUTEROL SULFATE SCH: 2.5 SOLUTION RESPIRATORY (INHALATION) at 16:00

## 2018-07-18 RX ADMIN — PROMETHAZINE HYDROCHLORIDE AND DEXTROMETHORPHAN HYDROBROMIDE SCH: 15; 6.25 SYRUP ORAL at 07:06

## 2018-07-18 RX ADMIN — PROMETHAZINE HYDROCHLORIDE AND DEXTROMETHORPHAN HYDROBROMIDE SCH: 15; 6.25 SYRUP ORAL at 23:00

## 2018-07-18 RX ADMIN — MORPHINE SULFATE PRN MG: 15 TABLET ORAL at 19:16

## 2018-07-18 RX ADMIN — ALBUTEROL SULFATE SCH: 2.5 SOLUTION RESPIRATORY (INHALATION) at 04:25

## 2018-07-18 RX ADMIN — ALBUTEROL SULFATE SCH MG: 2.5 SOLUTION RESPIRATORY (INHALATION) at 07:10

## 2018-07-18 RX ADMIN — PROMETHAZINE HYDROCHLORIDE AND DEXTROMETHORPHAN HYDROBROMIDE SCH: 15; 6.25 SYRUP ORAL at 01:00

## 2018-07-18 RX ADMIN — ALBUTEROL SULFATE SCH: 2.5 SOLUTION RESPIRATORY (INHALATION) at 00:56

## 2018-07-18 RX ADMIN — ALBUTEROL SULFATE SCH MG: 2.5 SOLUTION RESPIRATORY (INHALATION) at 20:07

## 2018-07-18 RX ADMIN — ALBUTEROL SULFATE SCH MG: 2.5 SOLUTION RESPIRATORY (INHALATION) at 14:06

## 2018-07-18 NOTE — PCM.SURG1
Surgeon's Initial Post Op Note





- Surgeon's Notes


Surgeon: Milo Joe MD


Assistant: NONE


Type of Anesthesia: Local


Pre-Operative Diagnosis: Metastatic breast cancer, bilateral pleural effusion, 

dyspnea


Operative Findings: CT showed nodular pleural thickening, loculated effusion. 

US showed moderate effusion left and righ tlung.


Post-Operative Diagnosis: Metastatic breast cancer, bilateral pleural effusion, 

dyspnea


Operation Performed: Right and left 8.5 Fr pigtail pleural drainage catheter 

placement.


Specimen/Specimens Removed: none


Estimated Blood Loss: EBL {In ML}: 2


Blood Products Given: N/A


Drains Used: No Drains


Post-Op Condition: Fair


Date of Surgery/Procedure: 07/18/18


Time of Surgery/Procedure: 09:45

## 2018-07-19 RX ADMIN — MORPHINE SULFATE PRN MG: 15 TABLET ORAL at 02:36

## 2018-07-19 RX ADMIN — ENOXAPARIN SODIUM SCH MG: 30 INJECTION SUBCUTANEOUS at 21:18

## 2018-07-19 RX ADMIN — POTASSIUM CHLORIDE SCH MEQ: 20 TABLET, EXTENDED RELEASE ORAL at 10:03

## 2018-07-19 RX ADMIN — MORPHINE SULFATE PRN MG: 15 TABLET ORAL at 12:06

## 2018-07-19 RX ADMIN — ALBUTEROL SULFATE SCH MG: 2.5 SOLUTION RESPIRATORY (INHALATION) at 12:00

## 2018-07-19 RX ADMIN — ALBUTEROL SULFATE SCH MG: 2.5 SOLUTION RESPIRATORY (INHALATION) at 23:43

## 2018-07-19 RX ADMIN — PROMETHAZINE HYDROCHLORIDE AND DEXTROMETHORPHAN HYDROBROMIDE SCH: 15; 6.25 SYRUP ORAL at 15:50

## 2018-07-19 RX ADMIN — ALBUTEROL SULFATE SCH MG: 2.5 SOLUTION RESPIRATORY (INHALATION) at 20:19

## 2018-07-19 RX ADMIN — PROMETHAZINE HYDROCHLORIDE AND CODEINE PHOSPHATE PRN ML: 6.25; 1 SOLUTION ORAL at 11:43

## 2018-07-19 RX ADMIN — PROMETHAZINE HYDROCHLORIDE AND DEXTROMETHORPHAN HYDROBROMIDE SCH: 15; 6.25 SYRUP ORAL at 08:02

## 2018-07-19 RX ADMIN — ALBUTEROL SULFATE SCH MG: 2.5 SOLUTION RESPIRATORY (INHALATION) at 16:03

## 2018-07-19 RX ADMIN — PROMETHAZINE HYDROCHLORIDE AND DEXTROMETHORPHAN HYDROBROMIDE SCH: 15; 6.25 SYRUP ORAL at 01:00

## 2018-07-19 RX ADMIN — DEXTROSE AND SODIUM CHLORIDE SCH MLS/HR: 5; 900 INJECTION, SOLUTION INTRAVENOUS at 21:15

## 2018-07-19 RX ADMIN — ALBUTEROL SULFATE SCH MG: 2.5 SOLUTION RESPIRATORY (INHALATION) at 03:08

## 2018-07-19 RX ADMIN — ALBUTEROL SULFATE SCH MG: 2.5 SOLUTION RESPIRATORY (INHALATION) at 07:30

## 2018-07-19 RX ADMIN — PROMETHAZINE HYDROCHLORIDE AND DEXTROMETHORPHAN HYDROBROMIDE SCH ML: 15; 6.25 SYRUP ORAL at 10:47

## 2018-07-19 RX ADMIN — ALBUTEROL SULFATE SCH: 2.5 SOLUTION RESPIRATORY (INHALATION) at 00:06

## 2018-07-19 RX ADMIN — PROMETHAZINE HYDROCHLORIDE AND DEXTROMETHORPHAN HYDROBROMIDE SCH ML: 15; 6.25 SYRUP ORAL at 20:00

## 2018-07-19 NOTE — RAD
Date of service: 



07/19/2018



HISTORY:

Pneumothorax.



COMPARISON:

07/18/2018 



FINDINGS:



LUNGS:

Multiple pulmonary masses unchanged.



PLEURA:

Pigtail catheters in the pleural spaces bilaterally. Small basilar 

pneumothoraces again identified.



CARDIOVASCULAR:

Normal.



OSSEOUS STRUCTURES:

No significant abnormalities.



VISUALIZED UPPER ABDOMEN:

Normal.



OTHER FINDINGS:

None.



IMPRESSION:

No significant interval change compared to the prior examination(s). 

Specifically pigtail catheters in the pleural space is unchanged in 

position as are small basilar pneumothoraces.



Stable pulmonary metastasis.

## 2018-07-19 NOTE — CP.PCM.PN
Subjective





- Date & Time of Evaluation


Date of Evaluation: 07/18/18


Time of Evaluation: 22:20





- Subjective


Subjective: 


Spoke to pt late last night and pt worried she will not make it to [facility] meeting 

on Friday if portacath placed as she does not tolerate pain very well and may 

need heavy pain meds that will make her thinking cloudy. Upon pt request, 

requested that surgery postpone said procedure until pt more agreeable to 

timing. Will reschedule outpatient once pt knows clinical trial requirements. 


> Seen and evaluated by Pulmonary. Agree with plan for pigtail cath. Had 

instructed nursing to inform pt that pt could have pigtail or regular chest 

tube put in for drainage--one was amenable to and could be smaller than usual 

chest tube. 


Addendum: Noted Pulmonary notes. ABG with increased CO2 and placed on bipap. 

Doing better with evacuation of pleural effusion. 








Objective





- Vital Signs/Intake and Output


Vital Signs (last 24 hours): 


 











Temp Pulse Resp BP Pulse Ox


 


 97.2 F L  110 H  20   89/67 L  100 


 


 07/19/18 15:03  07/19/18 16:00  07/19/18 15:03  07/19/18 15:03  07/19/18 15:03








Intake and Output: 


 











 07/19/18 07/20/18





 18:59 06:59


 


Intake Total 300 


 


Output Total 110 


 


Balance 190 














- Medications


Medications: 


 Current Medications





Acetaminophen (Tylenol 325mg Tab)  650 mg PO Q6 PRN


   PRN Reason: Pain, moderate (4-7)


   Last Admin: 07/18/18 06:21 Dose:  650 mg


Albuterol Sulfate (Albuterol 0.083% Inhal Sol (2.5 Mg/3 Ml) Ud)  2.5 mg INH RQ4 

[facility]


   Last Admin: 07/19/18 20:19 Dose:  2.5 mg


Bisacodyl (Dulcolax)  10 mg [place] DAILY PRN


   PRN Reason: Constipation


Docusate Sodium (Colace)  200 mg PO AC [facility]


   Last Admin: 07/19/18 17:43 Dose:  200 mg


Enoxaparin Sodium (Lovenox)  30 mg SC 1000,2200 [facility]


   Last Admin: 07/17/18 22:21 Dose:  30 mg


Ceftriaxone Sodium 1 gm/ (Sodium Chloride)  100 mls @ 100 mls/hr IVPB Q12H [facility]


   PRN Reason: Protocol


Dextrose/Sodium Chloride (Dextrose 5%/0.9% Ns 1000 Ml)  1,000 mls @ 50 mls/hr 

IV .Q20H [facility]


Methylprednisolone (Solu-Medrol)  40 mg IVP BID [facility]


Morphine Sulfate (Morphine)  2 mg IVP Q3H PRN


   PRN Reason: for moderate to severe pain


   Last Admin: 07/19/18 10:02 Dose:  2 mg


Morphine Sulfate (Morphine Immediate Release Tab)  15 mg PO Q6 PRN


   PRN Reason: Pain, severe (8-10)


Potassium Chloride (K-Dur 20 Meq Er Tab)  20 meq PO DAILY [facility]


   Last Admin: 07/19/18 10:03 Dose:  20 meq


Promethazine HCl/Codeine (Phenergan/Codeine Oral Syrup)  5 ml PO Q4 PRN


   PRN Reason: cns depression


   Last Admin: 07/19/18 11:43 Dose:  5 ml


Promethazine HCl/Dextromethorphan (Phenergan Dm Syrup)  5 ml PO Q6H [facility]


   Last Admin: 07/19/18 15:50 Dose:  Not Given


Zolpidem Tartrate (Ambien)  5 mg PO HS PRN


   PRN Reason: Insomnia


   Last Admin: 07/17/18 23:36 Dose:  5 mg











- Labs


Labs: 


 





 07/17/18 21:19 





 07/17/18 21:19 





 











PT  12.0 SECONDS (9.7-12.2)   07/17/18  21:19    


 


INR  1.1   07/17/18  21:19    


 


APTT  29 SECONDS (21-34)   07/17/18  21:19    














Assessment and Plan


(1) Metastatic breast cancer


Assessment & Plan: 


s/p pigtail cathether placement today for malignant pleural effusion 


Status: Acute   





(2) Dyspnea


Status: Acute   





(3) Hypoxemia


Status: Acute   





(4) Fatigue due to sleep pattern disturbance


Status: Acute   





(5) Cough


Status: Acute

## 2018-07-19 NOTE — CP.PCM.PN
Subjective





- Date & Time of Evaluation


Date of Evaluation: 07/19/18


Time of Evaluation: 20:00





- Subjective


Subjective: 





This morning patient was feeling slightly better, but later started having 

increasing cough.


Shortness of breath noted, placed on BiPAP.


Cough medication was given.


Bilateral wheezing, decreased air lung entry noted.





Vital signs stable.


Chest bilateral good air entry, but decreased in the lung fields noted on the 

lower side


Tachycardia noted.


Sweating present.





Repeat chest x-ray showing improvement in the pleural effusion noted.


Vascular congestion noted.





Assessment and recommendation:


47-year-old female with metastatic breast cancer with the poor prognosis.


Status post bilateral pigtail catheter.


We will continue to monitor.


IV fluid.


BiPAP and will follow the patient





Objective





- Vital Signs/Intake and Output


Vital Signs (last 24 hours): 


 











Temp Pulse Resp BP Pulse Ox


 


 97.2 F L  110 H  20   89/67 L  100 


 


 07/19/18 15:03  07/19/18 16:00  07/19/18 15:03  07/19/18 15:03  07/19/18 15:03








Intake and Output: 


 











 07/19/18 07/20/18





 18:59 06:59


 


Intake Total 300 


 


Output Total 110 


 


Balance 190 














- Medications


Medications: 


 Current Medications





Acetaminophen (Tylenol 325mg Tab)  650 mg PO Q6 PRN


   PRN Reason: Pain, moderate (4-7)


   Last Admin: 07/18/18 06:21 Dose:  650 mg


Albuterol Sulfate (Albuterol 0.083% Inhal Sol (2.5 Mg/3 Ml) Ud)  2.5 mg INH RQ4 

[facility]


   Last Admin: 07/19/18 16:03 Dose:  2.5 mg


Bisacodyl (Dulcolax)  10 mg [place] DAILY PRN


   PRN Reason: Constipation


Docusate Sodium (Colace)  200 mg PO AC [facility]


   Last Admin: 07/19/18 17:43 Dose:  200 mg


Enoxaparin Sodium (Lovenox)  30 mg SC 1000,2200 [facility]


   Last Admin: 07/17/18 22:21 Dose:  30 mg


Morphine Sulfate (Morphine)  2 mg IVP Q3H PRN


   PRN Reason: for moderate to severe pain


   Last Admin: 07/19/18 10:02 Dose:  2 mg


Morphine Sulfate (Morphine Immediate Release Tab)  15 mg PO Q6 PRN


   PRN Reason: Pain, severe (8-10)


Potassium Chloride (K-Dur 20 Meq Er Tab)  20 meq PO DAILY [facility]


   Last Admin: 07/19/18 10:03 Dose:  20 meq


Promethazine HCl/Codeine (Phenergan/Codeine Oral Syrup)  5 ml PO Q4 PRN


   PRN Reason: cns depression


   Last Admin: 07/19/18 11:43 Dose:  5 ml


Promethazine HCl/Dextromethorphan (Phenergan Dm Syrup)  5 ml PO Q6H [facility]


   Last Admin: 07/19/18 15:50 Dose:  Not Given


Zolpidem Tartrate (Ambien)  5 mg PO HS PRN


   PRN Reason: Insomnia


   Last Admin: 07/17/18 23:36 Dose:  5 mg











- Labs


Labs: 


 





 07/17/18 21:19 





 07/17/18 21:19 





 











PT  12.0 SECONDS (9.7-12.2)   07/17/18  21:19    


 


INR  1.1   07/17/18  21:19    


 


APTT  29 SECONDS (21-34)   07/17/18  21:19

## 2018-07-19 NOTE — CP.PCM.PN
Subjective





- Date & Time of Evaluation


Date of Evaluation: 07/19/18


Time of Evaluation: 16:00





- Subjective


Subjective: 





On bipap, daughter at bedside





Objective





- Vital Signs/Intake and Output


Vital Signs (last 24 hours): 


 











Temp Pulse Resp BP Pulse Ox


 


 97.2 F L  110 H  20   89/67 L  100 


 


 07/19/18 15:03  07/19/18 16:00  07/19/18 15:03  07/19/18 15:03  07/19/18 15:03








Intake and Output: 


 











 07/19/18 07/20/18





 18:59 06:59


 


Intake Total 300 


 


Output Total 110 


 


Balance 190 














- Medications


Medications: 


 Current Medications





Acetaminophen (Tylenol 325mg Tab)  650 mg PO Q6 PRN


   PRN Reason: Pain, moderate (4-7)


   Last Admin: 07/18/18 06:21 Dose:  650 mg


Albuterol Sulfate (Albuterol 0.083% Inhal Sol (2.5 Mg/3 Ml) Ud)  2.5 mg INH RQ4 

[facility]


   Last Admin: 07/19/18 16:03 Dose:  2.5 mg


Bisacodyl (Dulcolax)  10 mg [place] DAILY PRN


   PRN Reason: Constipation


Docusate Sodium (Colace)  200 mg PO AC [facility]


   Last Admin: 07/19/18 17:43 Dose:  200 mg


Enoxaparin Sodium (Lovenox)  30 mg SC 1000,2200 [facility]


   Last Admin: 07/17/18 22:21 Dose:  30 mg


Morphine Sulfate (Morphine)  2 mg IVP Q3H PRN


   PRN Reason: for moderate to severe pain


   Last Admin: 07/19/18 10:02 Dose:  2 mg


Morphine Sulfate (Morphine Immediate Release Tab)  15 mg PO Q6 PRN


   PRN Reason: Pain, severe (8-10)


Potassium Chloride (K-Dur 20 Meq Er Tab)  20 meq PO DAILY [facility]


   Last Admin: 07/19/18 10:03 Dose:  20 meq


Promethazine HCl/Codeine (Phenergan/Codeine Oral Syrup)  5 ml PO Q4 PRN


   PRN Reason: cns depression


   Last Admin: 07/19/18 11:43 Dose:  5 ml


Promethazine HCl/Dextromethorphan (Phenergan Dm Syrup)  5 ml PO Q6H [facility]


   Last Admin: 07/19/18 15:50 Dose:  Not Given


Zolpidem Tartrate (Ambien)  5 mg PO HS PRN


   PRN Reason: Insomnia


   Last Admin: 07/17/18 23:36 Dose:  5 mg











- Labs


Labs: 


 





 07/17/18 21:19 





 07/17/18 21:19 





 











PT  12.0 SECONDS (9.7-12.2)   07/17/18  21:19    


 


INR  1.1   07/17/18  21:19    


 


APTT  29 SECONDS (21-34)   07/17/18  21:19    














- Head Exam


Head Exam: ATRAUMATIC





- Eye Exam


Eye Exam: Normal appearance





- ENT Exam


ENT Exam: Mucous Membranes Dry





- Respiratory Exam


Respiratory Exam: Decreased Breath Sounds





- Cardiovascular Exam


Cardiovascular Exam: +S1, +S2





- GI/Abdominal Exam


GI & Abdominal Exam: Normal Bowel Sounds





Assessment and Plan


(1) Pleural effusion


Assessment & Plan: 


likely malignant


s/p pig tail catheter placement


f/u cytology


Status: Acute   





(2) Metastatic breast cancer


Assessment & Plan: 


stage IV


triple negative disease


declined portacath placement as wants to get opinion at [facility]


for [facility] appointment for immunotherapy trial consideration


may require chemotherapy +/- radiotherapy while bridging to trial immunotherapy 

given degree of symptomatic tumor burden


Status: Acute   





(3) Thrombocytosis


Assessment & Plan: 


reactive to malignancy


Status: Acute

## 2018-07-19 NOTE — CP.PCM.PN
Subjective





- Date & Time of Evaluation


Date of Evaluation: 07/18/18


Time of Evaluation: 19:58





- Subjective


Subjective: 





I spoke to the interventional radiologist.


Patient underwent a bilateral pigtail catheter insertion.


In the evening there was a significant amount of hemorrhagic pleural effusion 

drainage noted.


Patient was continued to have shortness of breath.


Placed on BiPAP.








Objective





- Vital Signs/Intake and Output


Vital Signs (last 24 hours): 


 











Temp Pulse Resp BP Pulse Ox


 


 97.2 F L  110 H  20   89/67 L  100 


 


 07/19/18 15:03  07/19/18 16:00  07/19/18 15:03  07/19/18 15:03  07/19/18 15:03








Intake and Output: 


 











 07/19/18 07/20/18





 18:59 06:59


 


Intake Total 300 


 


Output Total 110 


 


Balance 190 














- Medications


Medications: 


 Current Medications





Acetaminophen (Tylenol 325mg Tab)  650 mg PO Q6 PRN


   PRN Reason: Pain, moderate (4-7)


   Last Admin: 07/18/18 06:21 Dose:  650 mg


Albuterol Sulfate (Albuterol 0.083% Inhal Sol (2.5 Mg/3 Ml) Ud)  2.5 mg INH RQ4 

[facility]


   Last Admin: 07/19/18 16:03 Dose:  2.5 mg


Bisacodyl (Dulcolax)  10 mg [place] DAILY PRN


   PRN Reason: Constipation


Docusate Sodium (Colace)  200 mg PO AC [facility]


   Last Admin: 07/19/18 17:43 Dose:  200 mg


Enoxaparin Sodium (Lovenox)  30 mg SC 1000,2200 [facility]


   Last Admin: 07/17/18 22:21 Dose:  30 mg


Morphine Sulfate (Morphine)  2 mg IVP Q3H PRN


   PRN Reason: for moderate to severe pain


   Last Admin: 07/19/18 10:02 Dose:  2 mg


Morphine Sulfate (Morphine Immediate Release Tab)  15 mg PO Q6 PRN


   PRN Reason: Pain, severe (8-10)


Potassium Chloride (K-Dur 20 Meq Er Tab)  20 meq PO DAILY [facility]


   Last Admin: 07/19/18 10:03 Dose:  20 meq


Promethazine HCl/Codeine (Phenergan/Codeine Oral Syrup)  5 ml PO Q4 PRN


   PRN Reason: cns depression


   Last Admin: 07/19/18 11:43 Dose:  5 ml


Promethazine HCl/Dextromethorphan (Phenergan Dm Syrup)  5 ml PO Q6H [facility]


   Last Admin: 07/19/18 15:50 Dose:  Not Given


Zolpidem Tartrate (Ambien)  5 mg PO HS PRN


   PRN Reason: Insomnia


   Last Admin: 07/17/18 23:36 Dose:  5 mg











- Labs


Labs: 


 





 07/17/18 21:19 





 07/17/18 21:19 





 











PT  12.0 SECONDS (9.7-12.2)   07/17/18  21:19    


 


INR  1.1   07/17/18  21:19    


 


APTT  29 SECONDS (21-34)   07/17/18  21:19

## 2018-07-19 NOTE — CP.PCM.PN
Subjective





- Date & Time of Evaluation


Date of Evaluation: 07/18/18


Time of Evaluation: 15:00





- Subjective


Subjective: 





Has pain post chest tube insertions





Objective





- Vital Signs/Intake and Output


Vital Signs (last 24 hours): 


 











Temp Pulse Resp BP Pulse Ox


 


 97.2 F L  110 H  20   89/67 L  100 


 


 07/19/18 15:03  07/19/18 16:00  07/19/18 15:03  07/19/18 15:03  07/19/18 15:03








Intake and Output: 


 











 07/19/18 07/20/18





 18:59 06:59


 


Intake Total 300 


 


Output Total 110 


 


Balance 190 














- Medications


Medications: 


 Current Medications





Acetaminophen (Tylenol 325mg Tab)  650 mg PO Q6 PRN


   PRN Reason: Pain, moderate (4-7)


   Last Admin: 07/18/18 06:21 Dose:  650 mg


Albuterol Sulfate (Albuterol 0.083% Inhal Sol (2.5 Mg/3 Ml) Ud)  2.5 mg INH RQ4 

[facility]


   Last Admin: 07/19/18 16:03 Dose:  2.5 mg


Bisacodyl (Dulcolax)  10 mg [place] DAILY PRN


   PRN Reason: Constipation


Docusate Sodium (Colace)  200 mg PO AC [facility]


   Last Admin: 07/19/18 17:43 Dose:  200 mg


Enoxaparin Sodium (Lovenox)  30 mg SC 1000,2200 [facility]


   Last Admin: 07/17/18 22:21 Dose:  30 mg


Morphine Sulfate (Morphine)  2 mg IVP Q3H PRN


   PRN Reason: for moderate to severe pain


   Last Admin: 07/19/18 10:02 Dose:  2 mg


Morphine Sulfate (Morphine Immediate Release Tab)  15 mg PO Q6 PRN


   PRN Reason: Pain, severe (8-10)


Potassium Chloride (K-Dur 20 Meq Er Tab)  20 meq PO DAILY [facility]


   Last Admin: 07/19/18 10:03 Dose:  20 meq


Promethazine HCl/Codeine (Phenergan/Codeine Oral Syrup)  5 ml PO Q4 PRN


   PRN Reason: cns depression


   Last Admin: 07/19/18 11:43 Dose:  5 ml


Promethazine HCl/Dextromethorphan (Phenergan Dm Syrup)  5 ml PO Q6H [facility]


   Last Admin: 07/19/18 15:50 Dose:  Not Given


Zolpidem Tartrate (Ambien)  5 mg PO HS PRN


   PRN Reason: Insomnia


   Last Admin: 07/17/18 23:36 Dose:  5 mg











- Labs


Labs: 


 





 07/17/18 21:19 





 07/17/18 21:19 





 











PT  12.0 SECONDS (9.7-12.2)   07/17/18  21:19    


 


INR  1.1   07/17/18  21:19    


 


APTT  29 SECONDS (21-34)   07/17/18  21:19    














- Head Exam


Head Exam: ATRAUMATIC





- Eye Exam


Eye Exam: Normal appearance





- ENT Exam


ENT Exam: Mucous Membranes Dry





- Respiratory Exam


Respiratory Exam: Decreased Breath Sounds





- Cardiovascular Exam


Cardiovascular Exam: +S1, +S2





- GI/Abdominal Exam


GI & Abdominal Exam: Normal Bowel Sounds





Assessment and Plan


(1) Pleural effusion


Assessment & Plan: 


likely malignant s/p pigtail catheters


f/u cytology


Status: Acute   





(2) Metastatic breast cancer


Assessment & Plan: 


stage IV


triple negative


[place] [facility] [place] +/- radiotherapy while bridging to trial/immunotherapy 

consideration


Status: Acute   





(3) Thrombocytosis


Assessment & Plan: 


likely reactive to malignancy


Status: Acute

## 2018-07-19 NOTE — RAD
Date of service: 



07/18/2018



HISTORY:

Chest tube  



COMPARISON:

7/19/2018 at 0922 hours and 7/18/2018 portable chest x-ray 2015 hours 



FINDINGS:



LUNGS:

The bilateral rounded and very masses compatible with patient's known 

Meniere parenchymal and pleural parenchymal metastases are renoted 

these findings are most pronounced over the inferior half of each 

hemithorax right greater than left. No interval change in the 

appearance is appreciated. The size of much earlier pleural effusions 

and or loculated effusions also appears similar to the most recent 

the chest x-ray. At each costophrenic angle there is a pigtail type 

catheter that on the right suggests the small right inferolateral 

pneumothorax just above this is a chest tube tip a mid right lung 

level-similar in appearance conceivably a minute left basal 

pneumothorax cannot be excluded contiguous with the pigtail type 

catheter in place. No interval apical pneumothoraces seen. Right 

apical pleural parenchymal thickening with nodular contour similar.



PLEURA:

As above



CARDIOVASCULAR:

Cardiomegaly-similar blending hilar and mediastinal lymphadenopathy 

inferred overall cardio mid mediastinal prominent silhouette 

unchanged.



OSSEOUS STRUCTURES:

No significant abnormalities.



VISUALIZED UPPER ABDOMEN:

Normal.



OTHER FINDINGS:

None.



IMPRESSION:

No interval change seen compared with the 7/18/2018 study at 2015 

hours small pigtail type catheters at each lung base without minimal 

new small right inferolateral pneumothorax is similar in appearance.  

The chest tube above this is similar in appearance.  Overall known 

pulmonary and pleural base metastatic disease/opacities are similar. 

Though megaly and this inferred bulky mediastinal hilar 

lymphadenopathy similar appearing



Comments if further evaluation or clarification of the sizes and 

specific locations of the minimal pneumothoraces is needed, CT would 

be more sensitive in this regard.

## 2018-07-19 NOTE — CP.PCM.PN
Subjective





- Date & Time of Evaluation


Date of Evaluation: 07/17/18


Time of Evaluation: 20:35





- Subjective


Subjective: 


Appreciate Heme/Onc input. 


Spoke with pt this AM and advised of recommendations. Noted pt's app[place] with [facility] 

[place] for inclusion in immunotherapy clinical trial at [facility]. Additional tests 

ordered by Heme/Onc. Noted suggestion for portacath placement as well, and 

placed call to Vascular Surgery for portacath placement. Pt scheduled for 

procedure in AM.  





Objective





- Vital Signs/Intake and Output


Vital Signs (last 24 hours): 


 











Temp Pulse Resp BP Pulse Ox


 


 97.2 F L  110 H  20   89/67 L  100 


 


 07/19/18 15:03  07/19/18 16:00  07/19/18 15:03  07/19/18 15:03  07/19/18 15:03








Intake and Output: 


 











 07/19/18 07/20/18





 18:59 06:59


 


Intake Total 300 


 


Output Total 110 


 


Balance 190 














- Medications


Medications: 


 Current Medications





Acetaminophen (Tylenol 325mg Tab)  650 mg PO Q6 PRN


   PRN Reason: Pain, moderate (4-7)


   Last Admin: 07/18/18 06:21 Dose:  650 mg


Albuterol Sulfate (Albuterol 0.083% Inhal Sol (2.5 Mg/3 Ml) Ud)  2.5 mg INH RQ4 

[facility]


   Last Admin: 07/19/18 20:19 Dose:  2.5 mg


Bisacodyl (Dulcolax)  10 mg NM DAILY PRN


   PRN Reason: Constipation


Docusate Sodium (Colace)  200 mg PO AC [facility]


   Last Admin: 07/19/18 17:43 Dose:  200 mg


Enoxaparin Sodium (Lovenox)  30 mg SC 1000,2200 [facility]


   Last Admin: 07/17/18 22:21 Dose:  30 mg


Ceftriaxone Sodium 1 gm/ (Sodium Chloride)  100 mls @ 100 mls/hr IVPB Q12H [facility]


   PRN Reason: Protocol


Dextrose/Sodium Chloride (Dextrose 5%/0.9% Ns 1000 Ml)  1,000 mls @ 50 mls/hr 

IV .Q20H [facility]


Methylprednisolone (Solu-Medrol)  40 mg IVP BID [facility]


Morphine Sulfate (Morphine)  2 mg IVP Q3H PRN


   PRN Reason: for moderate to severe pain


   Last Admin: 07/19/18 10:02 Dose:  2 mg


Morphine Sulfate (Morphine Immediate Release Tab)  15 mg PO Q6 PRN


   PRN Reason: Pain, severe (8-10)


Potassium Chloride (K-Dur 20 Meq Er Tab)  20 meq PO DAILY [facility]


   Last Admin: 07/19/18 10:03 Dose:  20 meq


Promethazine HCl/Codeine (Phenergan/Codeine Oral Syrup)  5 ml PO Q4 PRN


   PRN Reason: cns depression


   Last Admin: 07/19/18 11:43 Dose:  5 ml


Promethazine HCl/Dextromethorphan (Phenergan Dm Syrup)  5 ml PO Q6H [facility]


   Last Admin: 07/19/18 15:50 Dose:  Not Given


Zolpidem Tartrate (Ambien)  5 mg PO HS PRN


   PRN Reason: Insomnia


   Last Admin: 07/17/18 23:36 Dose:  5 mg











- Labs


Labs: 


 





 07/17/18 21:19 





 07/17/18 21:19 





 











PT  12.0 SECONDS (9.7-12.2)   07/17/18  21:19    


 


INR  1.1   07/17/18  21:19    


 


APTT  29 SECONDS (21-34)   07/17/18  21:19    














- Constitutional


Appears: In Acute Distress (respiratory, with shallow breaths)





- Head Exam


Head Exam: ATRAUMATIC, NORMAL INSPECTION





- Eye Exam


Eye Exam: Normal appearance


Pupil Exam: NORMAL ACCOMODATION, PERRL





- ENT Exam


ENT Exam: Normal Exam





- Neck Exam


Neck Exam: Normal Inspection





- Respiratory Exam


Respiratory Exam: Decreased Breath Sounds, Wheezes





- Cardiovascular Exam


Cardiovascular Exam: REGULAR RHYTHM (tachycardic at times)





- GI/Abdominal Exam


GI & Abdominal Exam: Firm (non-tender), Diminished Bowel Sounds





- Rectal Exam


Rectal Exam: Deferred





- Extremities Exam


Extremities Exam: Normal Inspection





- Back Exam


Back Exam: NORMAL INSPECTION





- Neurological Exam


Neurological Exam: CN II-XII Intact, Oriented x3


Neuro motor strength exam: Left Upper Extremity: 4, Right Upper Extremity: 4, 

Left Lower Extremity: 2/1, Right Lower Extremity: 2/1





- Skin


Skin Exam: Dry, Intact





Assessment and Plan


(1) Metastatic breast cancer


Assessment & Plan: 


stable


Status: Acute   





(2) Dyspnea


Assessment & Plan: 


unchanged


Status: Acute   





(3) Hypoxemia


Assessment & Plan: 


underlying cause still malignancy and pleural effusion, will monitor


Status: Acute   





(4) Fatigue due to sleep pattern disturbance


Assessment & Plan: 


pt afraid to fall asleep at times; daughter at bedside


Status: Acute   





(5) Cough


Assessment & Plan: 


2ndry to decreased aeration


Status: Acute

## 2018-07-20 LAB
ALBUMIN SERPL-MCNC: 3.6 G/DL (ref 3.5–5)
ALBUMIN/GLOB SERPL: 1 {RATIO} (ref 1–2.1)
ALT SERPL-CCNC: 44 U/L (ref 9–52)
ARTERIAL BLOOD GAS HEMOGLOBIN: 13.2 G/DL (ref 11.7–17.4)
ARTERIAL BLOOD GAS O2 SAT: 99.6 % (ref 95–98)
ARTERIAL BLOOD GAS PCO2: 51 MM/HG (ref 35–45)
ARTERIAL BLOOD GAS TCO2: 29.1 MMOL/L (ref 22–28)
AST SERPL-CCNC: 50 U/L (ref 14–36)
BASOPHILS # BLD AUTO: 0 K/UL (ref 0–0.2)
BASOPHILS NFR BLD: 0.6 % (ref 0–2)
BUN SERPL-MCNC: 10 MG/DL (ref 7–17)
CALCIUM SERPL-MCNC: 9.1 MG/DL (ref 8.6–10.4)
EOSINOPHIL # BLD AUTO: 0 K/UL (ref 0–0.7)
EOSINOPHIL NFR BLD: 0.2 % (ref 0–4)
ERYTHROCYTE [DISTWIDTH] IN BLOOD BY AUTOMATED COUNT: 13.2 % (ref 11.5–14.5)
GFR NON-AFRICAN AMERICAN: > 60
HCO3 BLDA-SCNC: 25.6 MMOL/L (ref 21–28)
HGB BLD-MCNC: 12.2 G/DL (ref 11–16)
LYMPHOCYTES # BLD AUTO: 1 K/UL (ref 1–4.3)
LYMPHOCYTES NFR BLD AUTO: 15.7 % (ref 20–40)
MCH RBC QN AUTO: 29.4 PG (ref 27–31)
MCHC RBC AUTO-ENTMCNC: 33.6 G/DL (ref 33–37)
MCV RBC AUTO: 87.5 FL (ref 81–99)
MONOCYTES # BLD: 0.3 K/UL (ref 0–0.8)
MONOCYTES NFR BLD: 4.1 % (ref 0–10)
NEUTROPHILS # BLD: 5 K/UL (ref 1.8–7)
NEUTROPHILS NFR BLD AUTO: 79.4 % (ref 50–75)
NRBC BLD AUTO-RTO: 0 % (ref 0–2)
PH BLDA: 7.34 [PH] (ref 7.35–7.45)
PLATELET # BLD: 544 K/UL (ref 130–400)
PMV BLD AUTO: 6.4 FL (ref 7.2–11.7)
PO2 BLDA: 187 MM/HG (ref 80–100)
RBC # BLD AUTO: 4.14 MIL/UL (ref 3.8–5.2)
WBC # BLD AUTO: 6.3 K/UL (ref 4.8–10.8)

## 2018-07-20 RX ADMIN — MORPHINE SULFATE PRN MG: 15 TABLET ORAL at 20:48

## 2018-07-20 RX ADMIN — ENOXAPARIN SODIUM SCH MG: 30 INJECTION SUBCUTANEOUS at 10:18

## 2018-07-20 RX ADMIN — ALBUTEROL SULFATE SCH: 2.5 SOLUTION RESPIRATORY (INHALATION) at 15:46

## 2018-07-20 RX ADMIN — PROMETHAZINE HYDROCHLORIDE AND DEXTROMETHORPHAN HYDROBROMIDE SCH: 15; 6.25 SYRUP ORAL at 06:37

## 2018-07-20 RX ADMIN — POTASSIUM CHLORIDE SCH MEQ: 20 TABLET, EXTENDED RELEASE ORAL at 10:18

## 2018-07-20 RX ADMIN — PROMETHAZINE HYDROCHLORIDE AND CODEINE PHOSPHATE PRN ML: 6.25; 1 SOLUTION ORAL at 02:07

## 2018-07-20 RX ADMIN — ALBUTEROL SULFATE SCH MG: 2.5 SOLUTION RESPIRATORY (INHALATION) at 23:39

## 2018-07-20 RX ADMIN — ALBUTEROL SULFATE SCH MG: 2.5 SOLUTION RESPIRATORY (INHALATION) at 20:08

## 2018-07-20 RX ADMIN — ENOXAPARIN SODIUM SCH MG: 30 INJECTION SUBCUTANEOUS at 22:38

## 2018-07-20 RX ADMIN — PROMETHAZINE HYDROCHLORIDE AND DEXTROMETHORPHAN HYDROBROMIDE SCH ML: 15; 6.25 SYRUP ORAL at 19:23

## 2018-07-20 RX ADMIN — ALBUTEROL SULFATE SCH MG: 2.5 SOLUTION RESPIRATORY (INHALATION) at 16:14

## 2018-07-20 RX ADMIN — ALBUTEROL SULFATE SCH MG: 2.5 SOLUTION RESPIRATORY (INHALATION) at 03:02

## 2018-07-20 RX ADMIN — PROMETHAZINE HYDROCHLORIDE AND DEXTROMETHORPHAN HYDROBROMIDE SCH: 15; 6.25 SYRUP ORAL at 01:43

## 2018-07-20 RX ADMIN — DEXTROSE AND SODIUM CHLORIDE SCH: 5; 900 INJECTION, SOLUTION INTRAVENOUS at 18:00

## 2018-07-20 RX ADMIN — ALBUTEROL SULFATE SCH MG: 2.5 SOLUTION RESPIRATORY (INHALATION) at 12:20

## 2018-07-20 RX ADMIN — PROMETHAZINE HYDROCHLORIDE AND DEXTROMETHORPHAN HYDROBROMIDE SCH ML: 15; 6.25 SYRUP ORAL at 12:41

## 2018-07-20 RX ADMIN — METHYLPREDNISOLONE SODIUM SUCCINATE SCH MG: 40 INJECTION, POWDER, FOR SOLUTION INTRAMUSCULAR; INTRAVENOUS at 17:32

## 2018-07-20 RX ADMIN — DEXTROSE AND SODIUM CHLORIDE SCH MLS/HR: 5; 900 INJECTION, SOLUTION INTRAVENOUS at 22:37

## 2018-07-20 RX ADMIN — METHYLPREDNISOLONE SODIUM SUCCINATE SCH MG: 40 INJECTION, POWDER, FOR SOLUTION INTRAMUSCULAR; INTRAVENOUS at 10:18

## 2018-07-20 RX ADMIN — PROMETHAZINE HYDROCHLORIDE AND CODEINE PHOSPHATE PRN ML: 6.25; 1 SOLUTION ORAL at 11:18

## 2018-07-20 RX ADMIN — ALBUTEROL SULFATE SCH MG: 2.5 SOLUTION RESPIRATORY (INHALATION) at 07:20

## 2018-07-20 NOTE — CARD
--------------- APPROVED REPORT --------------





Date of service: 07/20/2018



EXAM: Two-dimensional and M-mode echocardiogram with Doppler and 

color Doppler.



INDICATION

Congestive Heart Failure Pulmonary edema, cough



RISK FACTORS

Hyperlipidemia



2D DIMENSIONS 

IVSd0.7   (0.7-1.1cm)Aortic Root (2D)2.7   (2.0-3.7cm)

LVDd3.9   (3.9-5.9cm)PWd0.6   (0.7-1.1cm)

LVDs2.7   (2.5-4.0cm)FS (%) 30.8   %

LVEF (%)59.0   (>50%)



M-Mode DIMENSIONS 

RVDd0.99   (2.1-3.2cm)Left Atrium (MM)2.17   (2.5-4.0cm)

IVSd0.56   (0.7-1.1cm)Aortic Root2.76   (2.2-3.7cm)

LVDd4.62   (4.0-5.6cm)Aortic Cusp Exc.1.83   (1.5-2.0cm)

PWd0.56   (0.7-1.1cm)FS (%) 41   %

LVDs2.74   (2.0-3.8cm)LVEF (%)65   (>50%)



Mitral Valve

MV E Flszyvih83.6cm/sMV A Ryxwmpiu21.5cm/sE/A ratio1.2



TDI

E/Lateral E'0.0E/Medial E'0.0



Tricuspid Valve

TR Peak Npcdloqa007pf/sTR Peak Gr.[id number]



 LEFT VENTRICLE 

The left ventricle is normal size.

There is normal left ventricular wall thickness.

The left ventricular function is normal.

The left ventricular ejection fraction is within the normal range.

There is normal LV segmental wall motion.

The left ventricular diastolic function is normal.



 RIGHT VENTRICLE 

The right ventricle is normal size.

There is normal right ventricular wall thickness.

The right ventricular systolic function is normal.



 ATRIA 

The left atrium size is normal.

The right atrium size is normal.



 AORTIC VALVE 

The aortic valve is thickened but opens well.

No aortic regurgitation is present.

There is no aortic valvular stenosis. 



 MITRAL VALVE 

The mitral valve is normal in structure.

There is no evidence of mitral valve prolapse.

There is no mitral valve stenosis.



 TRICUSPID VALVE 

The tricuspid valve is normal in structure.

There is trace tricuspid regurgitation.

There is moderate pulmonary hypertension.



 PULMONIC VALVE 

The pulmonary valve is normal in structure.

There is trace pulmonic valvular regurgitation. 



 GREAT VESSELS 

The aortic root is normal in size.

The IVC is normal in size and collapses >50% with inspiration.



 PERICARDIAL EFFUSION 

There is no pericardial effusion.



<Conclusion>

The left ventricle is normal size.

There is normal left ventricular wall thickness.

The left ventricular function is normal.

The left ventricular ejection fraction is within the normal range.

There is normal LV segmental wall motion.

The left ventricular diastolic function is normal.

There is moderate pulmonary hypertension.

## 2018-07-20 NOTE — CP.PCM.PN
Subjective





- Date & Time of Evaluation


Date of Evaluation: 07/20/18


Time of Evaluation: 13:04





- Subjective


Subjective: 


Touched base with most partties involved with pt care and current situation. 

Pulmonary had ordered bipap for possible home use, and I ordered ABG on NC 3L 

as per discussion with Pulmonary to see if pt will hold her PO2. Reviewed pt's 

VS with nurse and pain meds status, the last morphine having been given 7/19 (

today is 1 day after). Lastly, spoke with pt and daughter and inquired as to 

how pt felt about going home with the pigtail catheter. Patient says that she 

is not in pain at all with the pigtail cath. Explained also that because the 

effusion is from her cancer, the possibility that it will recur is almost a 

certainty. Hence it is my recommendation that she keep it until whatever 

clinical trials she gets enrolled in is able to control the malignancy and its 

effects.





As to which machine is best for her or that she feels better with, patient says 

that she doesn't have as much work of breathing with the BiPAP as with a nasal 

cannula. Daughter noted that she was much more rested after a night on the 

BiPAP and that she had stopped coughing. Considering that the malignancy is 

wrapped around the patient's left bronchus impeding airflow, the BiPAP machine 

pushes oxygen through the obstruction and allows the left lung field to 

function and oxygenate her blood. wiull If at all possible it is in the patient'

s best interest that the BiPAP machine be procured for her home use whatever 

the outcome may be. Will relay this information to case management to assist in 

getting approval from [employer]. 





In the meantime I have ordered and ABG to determine patient's PO2-- this is to 

be taken on 3 L of oxygen via nasal cannula, on the off-chance that pt'S bIpap 

is not approved.. 





Noted pt NPO x 2 days now. When I asked the nurse today, pt's O2 was not 

humidified and so she was having trouble swallowing bec her throat was very 

dry. Pt concurred that this was the case. Will start a trial of full liquid 

diet and observe. Will advance as tolerated. 








Objective





- Vital Signs/Intake and Output


Vital Signs (last 24 hours): 


 











Temp Pulse Resp BP Pulse Ox


 


 97 F L  105 H  20   92/67 L  100 


 


 07/20/18 08:29  07/20/18 08:29  07/20/18 08:29  07/20/18 08:29  07/20/18 08:29








Intake and Output: 


 











 07/20/18 07/20/18





 06:59 18:59


 


Intake Total 740 


 


Output Total 375 


 


Balance 365 














- Medications


Medications: 


 Current Medications





Acetaminophen (Tylenol 325mg Tab)  650 mg PO Q6 PRN


   PRN Reason: Pain, moderate (4-7)


   Last Admin: 07/18/18 06:21 Dose:  650 mg


Albuterol Sulfate (Albuterol 0.083% Inhal Sol (2.5 Mg/3 Ml) Ud)  2.5 mg INH RQ4 

[facility]


   Last Admin: 07/20/18 07:20 Dose:  2.5 mg


Bisacodyl (Dulcolax)  10 mg SC DAILY PRN


   PRN Reason: Constipation


Docusate Sodium (Colace)  200 mg PO AC [facility]


   Last Admin: 07/20/18 11:18 Dose:  200 mg


Enoxaparin Sodium (Lovenox)  30 mg SC 1000,2200 [facility]


   Last Admin: 07/20/18 10:18 Dose:  30 mg


Ceftriaxone Sodium 1 gm/ (Sodium Chloride)  100 mls @ 100 mls/hr IVPB Q12H [facility]


   PRN Reason: Protocol


   Last Admin: 07/20/18 10:18 Dose:  100 mls/hr


Dextrose/Sodium Chloride (Dextrose 5%/0.9% Ns 1000 Ml)  1,000 mls @ 50 mls/hr 

IV .Q20H [facility]


   Last Admin: 07/19/18 21:15 Dose:  50 mls/hr


Methylprednisolone (Solu-Medrol)  40 mg IVP BID [facility]


   Last Admin: 07/20/18 10:18 Dose:  40 mg


Morphine Sulfate (Morphine)  2 mg IVP Q3H PRN


   PRN Reason: for moderate to severe pain


   Last Admin: 07/19/18 10:02 Dose:  2 mg


Morphine Sulfate (Morphine Immediate Release Tab)  15 mg PO Q6 PRN


   PRN Reason: Pain, severe (8-10)


Potassium Chloride (K-Dur 20 Meq Er Tab)  20 meq PO DAILY [facility]


   Last Admin: 07/20/18 10:18 Dose:  20 meq


Promethazine HCl/Codeine (Phenergan/Codeine Oral Syrup)  5 ml PO Q4 PRN


   PRN Reason: cns depression


   Last Admin: 07/20/18 11:18 Dose:  5 ml


Promethazine HCl/Dextromethorphan (Phenergan Dm Syrup)  5 ml PO Q6H [facility]


   Last Admin: 07/20/18 12:41 Dose:  5 ml


Zolpidem Tartrate (Ambien)  5 mg PO HS PRN


   PRN Reason: Insomnia


   Last Admin: 07/17/18 23:36 Dose:  5 mg











- Labs


Labs: 


 





 07/20/18 07:04 





 07/20/18 07:04 





 











PT  12.0 SECONDS (9.7-12.2)   07/17/18  21:19    


 


INR  1.1   07/17/18  21:19    


 


APTT  29 SECONDS (21-34)   07/17/18  21:19    














- Constitutional


Appears: No Acute Distress, Cachectic (looks very weak)





- Head Exam


Head Exam: NORMAL INSPECTION, NORMOCEPHALIC (has gotten thinner)





- Eye Exam


Eye Exam: Normal appearance


Pupil Exam: NORMAL ACCOMODATION





- ENT Exam


ENT Exam: Mucous Membranes Dry





- Neck Exam


Neck Exam: Full ROM





- Respiratory Exam


Respiratory Exam: Decreased Breath Sounds (on L; N on R)





- Cardiovascular Exam


Cardiovascular Exam: REGULAR RHYTHM (  )





Assessment and Plan


(1) Metastatic breast cancer


Status: Acute   





(2) Dyspnea


Status: Acute   





(3) Hypoxemia


Status: Acute   





(4) Fatigue due to sleep pattern disturbance


Status: Acute   





(5) Cough


Status: Acute

## 2018-07-21 RX ADMIN — PROMETHAZINE HYDROCHLORIDE AND DEXTROMETHORPHAN HYDROBROMIDE SCH: 15; 6.25 SYRUP ORAL at 00:25

## 2018-07-21 RX ADMIN — PROMETHAZINE HYDROCHLORIDE AND DEXTROMETHORPHAN HYDROBROMIDE SCH: 15; 6.25 SYRUP ORAL at 13:41

## 2018-07-21 RX ADMIN — ALBUTEROL SULFATE SCH MG: 2.5 SOLUTION RESPIRATORY (INHALATION) at 11:12

## 2018-07-21 RX ADMIN — PROMETHAZINE HYDROCHLORIDE AND CODEINE PHOSPHATE PRN ML: 6.25; 1 SOLUTION ORAL at 07:57

## 2018-07-21 RX ADMIN — ENOXAPARIN SODIUM SCH MG: 30 INJECTION SUBCUTANEOUS at 10:49

## 2018-07-21 RX ADMIN — ENOXAPARIN SODIUM SCH MG: 30 INJECTION SUBCUTANEOUS at 21:05

## 2018-07-21 RX ADMIN — ALBUTEROL SULFATE SCH MG: 2.5 SOLUTION RESPIRATORY (INHALATION) at 19:37

## 2018-07-21 RX ADMIN — PROMETHAZINE HYDROCHLORIDE AND CODEINE PHOSPHATE PRN ML: 6.25; 1 SOLUTION ORAL at 03:39

## 2018-07-21 RX ADMIN — DEXTROSE AND SODIUM CHLORIDE SCH MLS/HR: 5; 900 INJECTION, SOLUTION INTRAVENOUS at 21:05

## 2018-07-21 RX ADMIN — PROMETHAZINE HYDROCHLORIDE AND DEXTROMETHORPHAN HYDROBROMIDE SCH: 15; 6.25 SYRUP ORAL at 07:05

## 2018-07-21 RX ADMIN — ALBUTEROL SULFATE SCH MG: 2.5 SOLUTION RESPIRATORY (INHALATION) at 03:09

## 2018-07-21 RX ADMIN — DEXTROSE AND SODIUM CHLORIDE SCH: 5; 900 INJECTION, SOLUTION INTRAVENOUS at 13:41

## 2018-07-21 RX ADMIN — METHYLPREDNISOLONE SODIUM SUCCINATE SCH MG: 40 INJECTION, POWDER, FOR SOLUTION INTRAMUSCULAR; INTRAVENOUS at 17:01

## 2018-07-21 RX ADMIN — ACETYLCYSTEINE SCH ML: 200 INHALANT RESPIRATORY (INHALATION) at 15:35

## 2018-07-21 RX ADMIN — PROMETHAZINE HYDROCHLORIDE AND CODEINE PHOSPHATE PRN ML: 6.25; 1 SOLUTION ORAL at 22:48

## 2018-07-21 RX ADMIN — PROMETHAZINE HYDROCHLORIDE AND DEXTROMETHORPHAN HYDROBROMIDE SCH ML: 15; 6.25 SYRUP ORAL at 18:01

## 2018-07-21 RX ADMIN — ALBUTEROL SULFATE SCH MG: 2.5 SOLUTION RESPIRATORY (INHALATION) at 07:20

## 2018-07-21 RX ADMIN — ACETYLCYSTEINE SCH: 200 INHALANT RESPIRATORY (INHALATION) at 19:37

## 2018-07-21 RX ADMIN — METHYLPREDNISOLONE SODIUM SUCCINATE SCH MG: 40 INJECTION, POWDER, FOR SOLUTION INTRAMUSCULAR; INTRAVENOUS at 10:49

## 2018-07-21 RX ADMIN — PROMETHAZINE HYDROCHLORIDE AND CODEINE PHOSPHATE PRN ML: 6.25; 1 SOLUTION ORAL at 13:45

## 2018-07-21 RX ADMIN — ALBUTEROL SULFATE SCH MG: 2.5 SOLUTION RESPIRATORY (INHALATION) at 15:35

## 2018-07-21 NOTE — CP.PCM.PN
Subjective





- Date & Time of Evaluation


Date of Evaluation: 07/21/18


Time of Evaluation: 13:25





- Subjective


Subjective: 





This morning patient seems to be less distress.


She was able to eat, drink some liquid diet.


Less cough noted.


Oxygen saturation was somewhat stable.


Vital signs temperature 98 pulse 118 blood pressure 109/68 saturation is 99% on 

nasal cannula.








On examination:


Chest decreased air entry in the left lungs noted.


Chest x-ray showing evidence of haziness in the left upper lung, mucous 

plugging possible.





Will add Mucomyst.


Chest PT.


Bronchodilators.


Will avoid BiPAP possibly.


Continue respiratory support and will follow the patient





Objective





- Vital Signs/Intake and Output


Vital Signs (last 24 hours): 


 











Temp Pulse Resp BP Pulse Ox


 


 98.0 F   111 H  18   109/68   99 


 


 07/21/18 07:00  07/21/18 12:18  07/21/18 07:00  07/21/18 07:00  07/21/18 07:00








Intake and Output: 


 











 07/21/18 07/21/18





 06:59 18:59


 


Intake Total 1000 500


 


Output Total 65 40


 


Balance 935 460














- Medications


Medications: 


 Current Medications





Acetaminophen (Tylenol 325mg Tab)  650 mg PO Q6 PRN


   PRN Reason: Pain, moderate (4-7)


   Last Admin: 07/20/18 19:21 Dose:  650 mg


Acetylcysteine (Acetylcysteine 20%)  4 ml INH Q6H [facility]


Albuterol Sulfate (Albuterol 0.083% Inhal Sol (2.5 Mg/3 Ml) Ud)  2.5 mg INH RQ4 

[facility]


   Last Admin: 07/21/18 11:12 Dose:  2.5 mg


Bisacodyl (Dulcolax)  10 mg [place] DAILY PRN


   PRN Reason: Constipation


Docusate Sodium (Colace)  200 mg PO AC [facility]


   Last Admin: 07/21/18 07:57 Dose:  200 mg


Enoxaparin Sodium (Lovenox)  30 mg SC 1000,2200 [facility]


   Last Admin: 07/21/18 10:49 Dose:  30 mg


Ceftriaxone Sodium 1 gm/ (Sodium Chloride)  100 mls @ 100 mls/hr IVPB Q12H [facility]


   PRN Reason: Protocol


   Last Admin: 07/21/18 08:23 Dose:  100 mls/hr


Dextrose/Sodium Chloride (Dextrose 5%/0.9% Ns 1000 Ml)  1,000 mls @ 50 mls/hr 

IV .Q20H [facility]


   Last Admin: 07/20/18 22:37 Dose:  50 mls/hr


Methylprednisolone (Solu-Medrol)  40 mg IVP BID [facility]


   Last Admin: 07/21/18 10:49 Dose:  40 mg


Morphine Sulfate (Morphine)  2 mg IVP Q3H PRN


   PRN Reason: for moderate to severe pain


   Last Admin: 07/21/18 08:07 Dose:  2 mg


Morphine Sulfate (Morphine Immediate Release Tab)  15 mg PO Q6 PRN


   PRN Reason: Pain, severe (8-10)


   Last Admin: 07/20/18 20:48 Dose:  15 mg


Promethazine HCl/Codeine (Phenergan/Codeine Oral Syrup)  5 ml PO Q4 PRN


   PRN Reason: cns depression


   Last Admin: 07/21/18 07:57 Dose:  5 ml


Promethazine HCl/Dextromethorphan (Phenergan Dm Syrup)  5 ml PO Q6H [facility]


   Last Admin: 07/21/18 07:05 Dose:  Not Given


Zolpidem Tartrate (Ambien)  5 mg PO HS PRN


   PRN Reason: Insomnia


   Last Admin: 07/17/18 23:36 Dose:  5 mg











- Labs


Labs: 


 





 07/20/18 07:04 





 07/20/18 07:04 





 











PT  12.0 SECONDS (9.7-12.2)   07/17/18  21:19    


 


INR  1.1   07/17/18  21:19    


 


APTT  29 SECONDS (21-34)   07/17/18  21:19

## 2018-07-21 NOTE — CP.PCM.PN
Subjective





- Date & Time of Evaluation


Date of Evaluation: 07/20/18


Time of Evaluation: 13:23





- Subjective


Subjective: 





Patient is still having tachypnea, also tachycardic.


Her cough is less forcible.


There is scanty mucus coming out.


No fever.


No chills noted.


On and off using BiPAP.





Chest decreased air entry in the lung fields.


Chest tube is noted, small amount of discharge still present





Assessment and recommendation:


47-year-old female with metastatic breast cancer.


Now having chest tube, stable at this time, but there is slight worsening of 

the lung condition noted.


Coughing noted.


We will continue the BiPAP, bronchodilators, antibiotic.


Possibly will discontinue the chest tube one at a time.





Objective





- Vital Signs/Intake and Output


Vital Signs (last 24 hours): 


 











Temp Pulse Resp BP Pulse Ox


 


 98.0 F   111 H  18   109/68   99 


 


 07/21/18 07:00  07/21/18 12:18  07/21/18 07:00  07/21/18 07:00  07/21/18 07:00








Intake and Output: 


 











 07/21/18 07/21/18





 06:59 18:59


 


Intake Total 1000 500


 


Output Total 65 40


 


Balance 935 460














- Medications


Medications: 


 Current Medications





Acetaminophen (Tylenol 325mg Tab)  650 mg PO Q6 PRN


   PRN Reason: Pain, moderate (4-7)


   Last Admin: 07/20/18 19:21 Dose:  650 mg


Acetylcysteine (Acetylcysteine 20%)  4 ml INH Q6H [facility]


Albuterol Sulfate (Albuterol 0.083% Inhal Sol (2.5 Mg/3 Ml) Ud)  2.5 mg INH RQ4 

[facility]


   Last Admin: 07/21/18 11:12 Dose:  2.5 mg


Bisacodyl (Dulcolax)  10 mg [place] DAILY PRN


   PRN Reason: Constipation


Docusate Sodium (Colace)  200 mg PO AC [facility]


   Last Admin: 07/21/18 07:57 Dose:  200 mg


Enoxaparin Sodium (Lovenox)  30 mg SC 1000,2200 [facility]


   Last Admin: 07/21/18 10:49 Dose:  30 mg


Ceftriaxone Sodium 1 gm/ (Sodium Chloride)  100 mls @ 100 mls/hr IVPB Q12H [facility]


   PRN Reason: Protocol


   Last Admin: 07/21/18 08:23 Dose:  100 mls/hr


Dextrose/Sodium Chloride (Dextrose 5%/0.9% Ns 1000 Ml)  1,000 mls @ 50 mls/hr 

IV .Q20H [facility]


   Last Admin: 07/20/18 22:37 Dose:  50 mls/hr


Methylprednisolone (Solu-Medrol)  40 mg IVP BID [facility]


   Last Admin: 07/21/18 10:49 Dose:  40 mg


Morphine Sulfate (Morphine)  2 mg IVP Q3H PRN


   PRN Reason: for moderate to severe pain


   Last Admin: 07/21/18 08:07 Dose:  2 mg


Morphine Sulfate (Morphine Immediate Release Tab)  15 mg PO Q6 PRN


   PRN Reason: Pain, severe (8-10)


   Last Admin: 07/20/18 20:48 Dose:  15 mg


Promethazine HCl/Codeine (Phenergan/Codeine Oral Syrup)  5 ml PO Q4 PRN


   PRN Reason: cns depression


   Last Admin: 07/21/18 07:57 Dose:  5 ml


Promethazine HCl/Dextromethorphan (Phenergan Dm Syrup)  5 ml PO Q6H [facility]


   Last Admin: 07/21/18 07:05 Dose:  Not Given


Zolpidem Tartrate (Ambien)  5 mg PO HS PRN


   PRN Reason: Insomnia


   Last Admin: 07/17/18 23:36 Dose:  5 mg











- Labs


Labs: 


 





 07/20/18 07:04 





 07/20/18 07:04 





 











PT  12.0 SECONDS (9.7-12.2)   07/17/18  21:19    


 


INR  1.1   07/17/18  21:19    


 


APTT  29 SECONDS (21-34)   07/17/18  21:19

## 2018-07-21 NOTE — CP.PCM.PN
Subjective





- Date & Time of Evaluation


Date of Evaluation: 07/20/18


Time of Evaluation: 20:00





- Subjective


Subjective: 





Has some cough.





Objective





- Vital Signs/Intake and Output


Vital Signs (last 24 hours): 


 











Temp Pulse Resp BP Pulse Ox


 


 97.5 F L  108 H  22   95/64 L  99 


 


 07/21/18 16:00  07/21/18 17:00  07/21/18 16:00  07/21/18 16:00  07/21/18 16:00








Intake and Output: 


 











 07/21/18 07/22/18





 18:59 06:59


 


Intake Total 1200 


 


Output Total 55 


 


Balance 1145 














- Medications


Medications: 


 Current Medications





Acetaminophen (Tylenol 325mg Tab)  650 mg PO Q6 PRN


   PRN Reason: Pain, moderate (4-7)


   Last Admin: 07/20/18 19:21 Dose:  650 mg


Acetylcysteine (Acetylcysteine 20%)  4 ml INH RQ6 [facility]


   Last Admin: 07/21/18 19:37 Dose:  Not Given


Albuterol Sulfate (Albuterol 0.083% Inhal Sol (2.5 Mg/3 Ml) Ud)  2.5 mg INH RQ4 

[facility]


   Last Admin: 07/21/18 19:37 Dose:  2.5 mg


Bisacodyl (Dulcolax)  10 mg [place] DAILY PRN


   PRN Reason: Constipation


Docusate Sodium (Colace)  200 mg PO AC [facility]


   Last Admin: 07/21/18 17:02 Dose:  200 mg


Enoxaparin Sodium (Lovenox)  30 mg SC 1000,2200 [facility]


   Last Admin: 07/21/18 10:49 Dose:  30 mg


Ceftriaxone Sodium 1 gm/ (Sodium Chloride)  100 mls @ 100 mls/hr IVPB Q12H [facility]


   PRN Reason: Protocol


   Last Admin: 07/21/18 08:23 Dose:  100 mls/hr


Dextrose/Sodium Chloride (Dextrose 5%/0.9% Ns 1000 Ml)  1,000 mls @ 50 mls/hr 

IV .Q20H [facility]


   Last Admin: 07/21/18 13:41 Dose:  Not Given


Methylprednisolone (Solu-Medrol)  40 mg IVP BID [facility]


   Last Admin: 07/21/18 17:01 Dose:  40 mg


Morphine Sulfate (Morphine)  2 mg IVP Q3H PRN


   PRN Reason: for moderate to severe pain


   Last Admin: 07/21/18 08:07 Dose:  2 mg


Morphine Sulfate (Morphine Immediate Release Tab)  15 mg PO Q6 PRN


   PRN Reason: Pain, severe (8-10)


   Last Admin: 07/20/18 20:48 Dose:  15 mg


Promethazine HCl/Codeine (Phenergan/Codeine Oral Syrup)  5 ml PO Q4 PRN


   PRN Reason: cns depression


   Last Admin: 07/21/18 13:45 Dose:  5 ml


Promethazine HCl/Dextromethorphan (Phenergan Dm Syrup)  5 ml PO Q6H [place]


   Last Admin: 07/21/18 18:01 Dose:  5 ml


Zolpidem Tartrate (Ambien)  5 mg PO HS PRN


   PRN Reason: Insomnia


   Last Admin: 07/17/18 23:36 Dose:  5 mg











- Labs


Labs: 


 





 07/20/18 07:04 





 07/20/18 07:04 





 











PT  12.0 SECONDS (9.7-12.2)   07/17/18  21:19    


 


INR  1.1   07/17/18  21:19    


 


APTT  29 SECONDS (21-34)   07/17/18  21:19    














- Head Exam


Head Exam: ATRAUMATIC





- Eye Exam


Eye Exam: Normal appearance





- ENT Exam


ENT Exam: Mucous Membranes Dry





- Respiratory Exam


Respiratory Exam: NORMAL BREATHING PATTERN





- Cardiovascular Exam


Cardiovascular Exam: +S1, +S2





- GI/Abdominal Exam


GI & Abdominal Exam: Normal Bowel Sounds





Assessment and Plan


(1) Pleural effusion


Assessment & Plan: 


likely malignant


awaiting cytology


has pigtail catheters


Status: Acute   





(2) Metastatic breast cancer


Assessment & Plan: 


stage IV


triple negative


immunotherapy clinical trial at [facility] [place] next Friday


immunotherapy markers added to prior biopsy at [facility] [place]


chemotherapy +/- radiotherapy if not an immunotherapy candidate





Status: Acute   





(3) Thrombocytosis


Assessment & Plan: 


reactive to malignancy


Status: Acute

## 2018-07-21 NOTE — CP.PCM.PN
Subjective





- Date & Time of Evaluation


Date of Evaluation: 07/21/18


Time of Evaluation: 18:00





- Subjective


Subjective: 





Has some cough, family at bedside





Objective





- Vital Signs/Intake and Output


Vital Signs (last 24 hours): 


 











Temp Pulse Resp BP Pulse Ox


 


 97.5 F L  108 H  22   95/64 L  99 


 


 07/21/18 16:00  07/21/18 17:00  07/21/18 16:00  07/21/18 16:00  07/21/18 16:00








Intake and Output: 


 











 07/21/18 07/22/18





 18:59 06:59


 


Intake Total 1200 


 


Output Total 55 


 


Balance 1145 














- Medications


Medications: 


 Current Medications





Acetaminophen (Tylenol 325mg Tab)  650 mg PO Q6 PRN


   PRN Reason: Pain, moderate (4-7)


   Last Admin: 07/20/18 19:21 Dose:  650 mg


Acetylcysteine (Acetylcysteine 20%)  4 ml INH RQ6 [facility]


   Last Admin: 07/21/18 19:37 Dose:  Not Given


Albuterol Sulfate (Albuterol 0.083% Inhal Sol (2.5 Mg/3 Ml) Ud)  2.5 mg INH RQ4 

[facility]


   Last Admin: 07/21/18 19:37 Dose:  2.5 mg


Bisacodyl (Dulcolax)  10 mg [place] DAILY PRN


   PRN Reason: Constipation


Docusate Sodium (Colace)  200 mg PO AC [facility]


   Last Admin: 07/21/18 17:02 Dose:  200 mg


Enoxaparin Sodium (Lovenox)  30 mg SC 1000,2200 [facility]


   Last Admin: 07/21/18 10:49 Dose:  30 mg


Ceftriaxone Sodium 1 gm/ (Sodium Chloride)  100 mls @ 100 mls/hr IVPB Q12H [facility]


   PRN Reason: Protocol


   Last Admin: 07/21/18 08:23 Dose:  100 mls/hr


Dextrose/Sodium Chloride (Dextrose 5%/0.9% Ns 1000 Ml)  1,000 mls @ 50 mls/hr 

IV .Q20H [facility]


   Last Admin: 07/21/18 13:41 Dose:  Not Given


Methylprednisolone (Solu-Medrol)  40 mg IVP BID [facility]


   Last Admin: 07/21/18 17:01 Dose:  40 mg


Morphine Sulfate (Morphine)  2 mg IVP Q3H PRN


   PRN Reason: for moderate to severe pain


   Last Admin: 07/21/18 08:07 Dose:  2 mg


Morphine Sulfate (Morphine Immediate Release Tab)  15 mg PO Q6 PRN


   PRN Reason: Pain, severe (8-10)


   Last Admin: 07/20/18 20:48 Dose:  15 mg


Promethazine HCl/Codeine (Phenergan/Codeine Oral Syrup)  5 ml PO Q4 PRN


   PRN Reason: cns depression


   Last Admin: 07/21/18 13:45 Dose:  5 ml


Promethazine HCl/Dextromethorphan (Phenergan Dm Syrup)  5 ml PO Q6H [facility]


   Last Admin: 07/21/18 18:01 Dose:  5 ml


Zolpidem Tartrate (Ambien)  5 mg PO HS PRN


   PRN Reason: Insomnia


   Last Admin: 07/17/18 23:36 Dose:  5 mg











- Labs


Labs: 


 





 07/20/18 07:04 





 07/20/18 07:04 





 











PT  12.0 SECONDS (9.7-12.2)   07/17/18  21:19    


 


INR  1.1   07/17/18  21:19    


 


APTT  29 SECONDS (21-34)   07/17/18  21:19    














- Head Exam


Head Exam: ATRAUMATIC





- Eye Exam


Eye Exam: Normal appearance





- ENT Exam


ENT Exam: Mucous Membranes Dry





- Respiratory Exam


Respiratory Exam: NORMAL BREATHING PATTERN





- Cardiovascular Exam


Cardiovascular Exam: +S1, +S2





- GI/Abdominal Exam


GI & Abdominal Exam: Normal Bowel Sounds





Assessment and Plan


(1) Pleural effusion


Assessment & Plan: 


likely malignant, f/u cytology


pigtail catheters


Status: Acute   





(2) Metastatic breast cancer


Assessment & Plan: 


stage IV


triple negative


[place] [facility] [place]otherapy markers [place] [facility] [place]


chemotherapy +/- radiotherapy if not an immunotherapy candidate


Status: Acute   





(3) Thrombocytosis


Assessment & Plan: 


likely reactive to malignancy


Status: Acute

## 2018-07-21 NOTE — CARD
--------------- APPROVED REPORT --------------





Date of service: 07/15/2018



EKG Measurement

Heart [id number]

AK [id number]

[id number]

[id number]

[id number]



<Conclusion>

Sinus tachycardia

Otherwise normal ECG

## 2018-07-21 NOTE — RAD
Date of service: 



07/21/2018



HISTORY:

chest tube  



COMPARISON:

Portable chest 07/19/2018. 



FINDINGS:



LUNGS:

There is interval complete opacification left apex suggestive of 

possible loculated pleural effusion or dense atelectasis. The lack of 

a significant midline shift toward the left of the midline anatomy 

suggests the former.  Clinically correlate. Scattered pulmonary 

masses remain. No apparent pneumothorax. Bilateral pleural drainage 

catheters remain in situ at the bilateral bases no basilar left 

pleural effusion.  Mild right pleural effusion in question.



PLEURA:

As above.



CARDIOVASCULAR:

Stable cardiac silhouette.  No definite pulmonary vascular congestion 

though left hilar region is obscured.



OSSEOUS STRUCTURES:

No significant abnormalities.



VISUALIZED UPPER ABDOMEN:

Normal.



OTHER FINDINGS:

None.



IMPRESSION:

Interval probable loculated pleural effusion left apex, favored over 

atelectasis left upper lobe. Scattered pulmonary masses reiterated 

bilaterally.



Bilateral pleural drainage catheters remain in situ with mild right 

pleural effusion now present.

## 2018-07-22 RX ADMIN — PROMETHAZINE HYDROCHLORIDE AND DEXTROMETHORPHAN HYDROBROMIDE SCH: 15; 6.25 SYRUP ORAL at 19:51

## 2018-07-22 RX ADMIN — ALBUTEROL SULFATE SCH: 2.5 SOLUTION RESPIRATORY (INHALATION) at 08:19

## 2018-07-22 RX ADMIN — ENOXAPARIN SODIUM SCH MG: 30 INJECTION SUBCUTANEOUS at 22:09

## 2018-07-22 RX ADMIN — METHYLPREDNISOLONE SODIUM SUCCINATE SCH MG: 40 INJECTION, POWDER, FOR SOLUTION INTRAMUSCULAR; INTRAVENOUS at 09:35

## 2018-07-22 RX ADMIN — PROMETHAZINE HYDROCHLORIDE AND CODEINE PHOSPHATE PRN ML: 6.25; 1 SOLUTION ORAL at 22:09

## 2018-07-22 RX ADMIN — ACETYLCYSTEINE SCH ML: 200 INHALANT RESPIRATORY (INHALATION) at 19:53

## 2018-07-22 RX ADMIN — METHYLPREDNISOLONE SODIUM SUCCINATE SCH MG: 40 INJECTION, POWDER, FOR SOLUTION INTRAMUSCULAR; INTRAVENOUS at 17:08

## 2018-07-22 RX ADMIN — ALBUTEROL SULFATE SCH MG: 2.5 SOLUTION RESPIRATORY (INHALATION) at 13:10

## 2018-07-22 RX ADMIN — ALBUTEROL SULFATE SCH: 2.5 SOLUTION RESPIRATORY (INHALATION) at 00:34

## 2018-07-22 RX ADMIN — ALBUTEROL SULFATE SCH MG: 2.5 SOLUTION RESPIRATORY (INHALATION) at 15:45

## 2018-07-22 RX ADMIN — ACETYLCYSTEINE SCH: 200 INHALANT RESPIRATORY (INHALATION) at 02:19

## 2018-07-22 RX ADMIN — ACETYLCYSTEINE SCH: 200 INHALANT RESPIRATORY (INHALATION) at 02:52

## 2018-07-22 RX ADMIN — ACETYLCYSTEINE SCH: 200 INHALANT RESPIRATORY (INHALATION) at 08:19

## 2018-07-22 RX ADMIN — PROMETHAZINE HYDROCHLORIDE AND DEXTROMETHORPHAN HYDROBROMIDE SCH: 15; 6.25 SYRUP ORAL at 13:11

## 2018-07-22 RX ADMIN — PROMETHAZINE HYDROCHLORIDE AND DEXTROMETHORPHAN HYDROBROMIDE SCH: 15; 6.25 SYRUP ORAL at 06:25

## 2018-07-22 RX ADMIN — ACETYLCYSTEINE SCH ML: 200 INHALANT RESPIRATORY (INHALATION) at 13:10

## 2018-07-22 RX ADMIN — ALBUTEROL SULFATE SCH MG: 2.5 SOLUTION RESPIRATORY (INHALATION) at 19:53

## 2018-07-22 RX ADMIN — ALBUTEROL SULFATE SCH: 2.5 SOLUTION RESPIRATORY (INHALATION) at 03:55

## 2018-07-22 RX ADMIN — PROMETHAZINE HYDROCHLORIDE AND CODEINE PHOSPHATE PRN ML: 6.25; 1 SOLUTION ORAL at 09:34

## 2018-07-22 RX ADMIN — ENOXAPARIN SODIUM SCH MG: 30 INJECTION SUBCUTANEOUS at 09:37

## 2018-07-22 RX ADMIN — MORPHINE SULFATE PRN MG: 15 TABLET ORAL at 19:32

## 2018-07-22 RX ADMIN — PROMETHAZINE HYDROCHLORIDE AND DEXTROMETHORPHAN HYDROBROMIDE SCH: 15; 6.25 SYRUP ORAL at 04:19

## 2018-07-22 NOTE — CP.PCM.PN
Subjective





- Date & Time of Evaluation


Date of Evaluation: 07/21/18


Time of Evaluation: 17:25





- Subjective


Subjective: 


received a call from pt's daughter re: possible discharge today per our 

discussion yesterday.


1) Pt is amenable to going home with pigtail cathether:  per Pulmonary would 

prob not be a good idea to send pt home with an interventional device but the 

flip side of the argument is that if we do remove the chest tube, pt would 

reaccumulate her malignant effusion and cause more respiratory distress.  AS 

this situation is on the extreme and extraordinary, and since pt and children 

could be relied on to keep the area clean, I would leave it with the pt and her 

family to decide if they want to keep the chest tube


2) pt needs home O2: per pulmonary, recommended  that pt be set up at home with 

bipap. Had conversation with rep from O2 company and he said per Medicare 

Guidelines, which is the standard by which other insurance companies base their 

decisions, only those with COPD and similar conditions are approved for home 

bipap. Also a sleep study is required prior to providing a CPAP or biPap 

machine at home I believe, though, that a case can be made for this patient: If 

the requirement is for at least 5 minutes of desaturation, defined as decrease 

of O2 sat vas measured via pulse oxximetry in room air in a sleep study, then 

this pt, even with O2 by NC will desaturate below 90$ O2 sat. This was the main 

reason why she was admitted. I think that in this case, despite the lack of the 

inclusionary diagnoses, an exception can be argued.  





Objective





- Vital Signs/Intake and Output


Vital Signs (last 24 hours): 


 











Temp Pulse Resp BP Pulse Ox


 


 97.5 F L  108 H  22   95/64 L  99 


 


 07/21/18 16:00  07/21/18 17:00  07/21/18 16:00  07/21/18 16:00  07/21/18 16:00








Intake and Output: 


 











 07/21/18 07/22/18





 18:59 06:59


 


Intake Total 1600 


 


Output Total 65 


 


Balance 1535 














- Medications


Medications: 


 Current Medications





Acetaminophen (Tylenol 325mg Tab)  650 mg PO Q6 PRN


   PRN Reason: Pain, moderate (4-7)


   Last Admin: 07/20/18 19:21 Dose:  650 mg


Acetylcysteine (Acetylcysteine 20%)  4 ml INH RQ6 [facility]


   Last Admin: 07/22/18 02:19 Dose:  Not Given


Albuterol Sulfate (Albuterol 0.083% Inhal Sol (2.5 Mg/3 Ml) Ud)  2.5 mg INH RQ4 

[facility]


   Last Admin: 07/22/18 03:55 Dose:  Not Given


Bisacodyl (Dulcolax)  10 mg [place] DAILY PRN


   PRN Reason: Constipation


Docusate Sodium (Colace)  200 mg PO AC [facility]


   Last Admin: 07/21/18 17:02 Dose:  200 mg


Enoxaparin Sodium (Lovenox)  30 mg SC 1000,2200 [facility]


   Last Admin: 07/21/18 21:05 Dose:  30 mg


Ceftriaxone Sodium 1 gm/ (Sodium Chloride)  100 mls @ 100 mls/hr IVPB Q12H [facility]


   PRN Reason: Protocol


   Last Admin: 07/21/18 21:05 Dose:  100 mls/hr


Methylprednisolone (Solu-Medrol)  40 mg IVP BID [facility]


   Last Admin: 07/21/18 17:01 Dose:  40 mg


Morphine Sulfate (Morphine)  2 mg IVP Q3H PRN


   PRN Reason: for moderate to severe pain


   Last Admin: 07/22/18 00:20 Dose:  2 mg


Morphine Sulfate (Morphine Immediate Release Tab)  15 mg PO Q6 PRN


   PRN Reason: Pain, severe (8-10)


   Last Admin: 07/20/18 20:48 Dose:  15 mg


Promethazine HCl/Codeine (Phenergan/Codeine Oral Syrup)  5 ml PO Q4 PRN


   PRN Reason: cns depression


   Last Admin: 07/21/18 22:48 Dose:  5 ml


Promethazine HCl/Dextromethorphan (Phenergan Dm Syrup)  5 ml PO Q6H [facility]


   Last Admin: 07/22/18 04:19 Dose:  Not Given


Zolpidem Tartrate (Ambien)  5 mg PO HS PRN


   PRN Reason: Insomnia


   Last Admin: 07/21/18 21:04 Dose:  5 mg











- Labs


Labs: 


 





 07/20/18 07:04 





 07/20/18 07:04 





 











PT  12.0 SECONDS (9.7-12.2)   07/17/18  21:19    


 


INR  1.1   07/17/18  21:19    


 


APTT  29 SECONDS (21-34)   07/17/18  21:19    














- Constitutional


Appears: Cachectic





- Head Exam


Head Exam: NORMAL INSPECTION, NORMOCEPHALIC





- Eye Exam


Eye Exam: Normal appearance


Pupil Exam: NORMAL ACCOMODATION





- ENT Exam


ENT Exam: Mucous Membranes Dry, Normal Exam (+JVD)





- Respiratory Exam


Respiratory Exam: Decreased Breath Sounds (breath sounds R>L, + occ wheeze)





- Cardiovascular Exam


Cardiovascular Exam: REGULAR RHYTHM





- GI/Abdominal Exam


GI & Abdominal Exam: Hypoactive Bowel Sounds





- Rectal Exam


Rectal Exam: Deferred





- Extremities Exam


Extremities Exam: Normal Inspection





- Back Exam


Back Exam: NORMAL INSPECTION





- Neurological Exam


Neuro motor strength exam: Left Upper Extremity: 3, Right Upper Extremity: 3, 

Left Lower Extremity: 3, Right Lower Extremity: 3





- Psychiatric Exam


Psychiatric exam: Depressed, Flat Affect





- Skin


Skin Exam: Diaphoretic, Intact, Normal Color





Assessment and Plan


(1) Pleural effusion


Assessment & Plan: 


almost 2 L drained from both lung fields at initial placement of chest tube. 

and roughly puts out about 30 ml total daily. if chest tube cannot go home with 

pt, will have to d/c chest tube today to ensure that no pneumonothoraces 

develop. 


Status: Acute   





(2) Metastatic breast cancer


Assessment & Plan: 


stable, xrays remain unchanged cancer wise although suspect mucus plugging per 

pulmonary


Status: Acute   





(3) Dyspnea


Assessment & Plan: 


improved with off and on bipap to blow of CO2


Status: Acute   





(4) Hypoxemia


Assessment & Plan: 


plan to request an exception with pt's insurance if they will consider a bipap 

in this situation? otherwise, will need to set up home O2 and O2 tanks be at pt'

s house before I will discharge pt


Status: Acute   





(5) Fatigue due to sleep pattern disturbance


Assessment & Plan: 


appeared to have more energy today than yesterday


Status: Acute   





(6) Cough


Assessment & Plan: 


present and expected. 


Status: Acute

## 2018-07-23 VITALS — SYSTOLIC BLOOD PRESSURE: 95 MMHG | TEMPERATURE: 97.5 F | RESPIRATION RATE: 18 BRPM | DIASTOLIC BLOOD PRESSURE: 56 MMHG

## 2018-07-23 VITALS — OXYGEN SATURATION: 100 %

## 2018-07-23 VITALS — HEART RATE: 108 BPM

## 2018-07-23 LAB
ALBUMIN SERPL-MCNC: 3.3 G/DL (ref 3.5–5)
ALBUMIN/GLOB SERPL: 1.1 {RATIO} (ref 1–2.1)
ALT SERPL-CCNC: 48 U/L (ref 9–52)
AST SERPL-CCNC: 44 U/L (ref 14–36)
BASOPHILS # BLD AUTO: 0 K/UL (ref 0–0.2)
BASOPHILS NFR BLD: 0.5 % (ref 0–2)
BUN SERPL-MCNC: 10 MG/DL (ref 7–17)
CALCIUM SERPL-MCNC: 8.7 MG/DL (ref 8.6–10.4)
EOSINOPHIL # BLD AUTO: 0.1 K/UL (ref 0–0.7)
EOSINOPHIL NFR BLD: 1.6 % (ref 0–4)
ERYTHROCYTE [DISTWIDTH] IN BLOOD BY AUTOMATED COUNT: 13.2 % (ref 11.5–14.5)
GFR NON-AFRICAN AMERICAN: > 60
HGB BLD-MCNC: 11.7 G/DL (ref 11–16)
LYMPHOCYTES # BLD AUTO: 2.1 K/UL (ref 1–4.3)
LYMPHOCYTES NFR BLD AUTO: 26.6 % (ref 20–40)
MCH RBC QN AUTO: 28.2 PG (ref 27–31)
MCHC RBC AUTO-ENTMCNC: 32.5 G/DL (ref 33–37)
MCV RBC AUTO: 87 FL (ref 81–99)
MONOCYTES # BLD: 0.9 K/UL (ref 0–0.8)
MONOCYTES NFR BLD: 11.3 % (ref 0–10)
NEUTROPHILS # BLD: 4.8 K/UL (ref 1.8–7)
NEUTROPHILS NFR BLD AUTO: 60 % (ref 50–75)
NRBC BLD AUTO-RTO: 0.1 % (ref 0–2)
PLATELET # BLD: 554 K/UL (ref 130–400)
PMV BLD AUTO: 6.2 FL (ref 7.2–11.7)
RBC # BLD AUTO: 4.14 MIL/UL (ref 3.8–5.2)
WBC # BLD AUTO: 8 K/UL (ref 4.8–10.8)

## 2018-07-23 RX ADMIN — METHYLPREDNISOLONE SODIUM SUCCINATE SCH MG: 40 INJECTION, POWDER, FOR SOLUTION INTRAMUSCULAR; INTRAVENOUS at 09:04

## 2018-07-23 RX ADMIN — ALBUTEROL SULFATE SCH MG: 2.5 SOLUTION RESPIRATORY (INHALATION) at 07:51

## 2018-07-23 RX ADMIN — ALBUTEROL SULFATE SCH: 2.5 SOLUTION RESPIRATORY (INHALATION) at 03:53

## 2018-07-23 RX ADMIN — PROMETHAZINE HYDROCHLORIDE AND DEXTROMETHORPHAN HYDROBROMIDE SCH ML: 15; 6.25 SYRUP ORAL at 08:39

## 2018-07-23 RX ADMIN — ACETYLCYSTEINE SCH: 200 INHALANT RESPIRATORY (INHALATION) at 03:53

## 2018-07-23 RX ADMIN — ALBUTEROL SULFATE SCH MG: 2.5 SOLUTION RESPIRATORY (INHALATION) at 11:10

## 2018-07-23 RX ADMIN — ENOXAPARIN SODIUM SCH MG: 30 INJECTION SUBCUTANEOUS at 09:10

## 2018-07-23 RX ADMIN — PROMETHAZINE HYDROCHLORIDE AND DEXTROMETHORPHAN HYDROBROMIDE SCH ML: 15; 6.25 SYRUP ORAL at 13:04

## 2018-07-23 RX ADMIN — ALBUTEROL SULFATE SCH MG: 2.5 SOLUTION RESPIRATORY (INHALATION) at 15:48

## 2018-07-23 RX ADMIN — ALBUTEROL SULFATE SCH: 2.5 SOLUTION RESPIRATORY (INHALATION) at 00:30

## 2018-07-23 RX ADMIN — ACETYLCYSTEINE SCH: 200 INHALANT RESPIRATORY (INHALATION) at 14:00

## 2018-07-23 RX ADMIN — ACETYLCYSTEINE SCH ML: 200 INHALANT RESPIRATORY (INHALATION) at 07:55

## 2018-07-23 RX ADMIN — PROMETHAZINE HYDROCHLORIDE AND DEXTROMETHORPHAN HYDROBROMIDE SCH: 15; 6.25 SYRUP ORAL at 01:00

## 2018-07-23 NOTE — CP.PCM.PN
Subjective





- Date & Time of Evaluation


Date of Evaluation: 07/23/18


Time of Evaluation: 15:51





- Subjective


Subjective: 


 -BP 97/63- O2 SAT AT REST AND ROOM AIR 92%. 





-FOLLOW UP WITH DR. HIGUERA IN THE OFFICE WITHIN 1-2 WEEKS.


-FOLLOW UP WITH DR. MORALES IN THE OFFICE WITHIN 3-5 DAYS---CALL FOR APPOINTMENT 

TIME.  DURING THIS VISIT YOU SHOULD DISCUSS WITH DR. MORALES 


 TREATMENT OPTIONS.


-ONCE YOU HAVE DISCUSSED TREATMENT AND CARE PLAN WITH DR. MORALES, MAKE AN 

APPOINTMENT FOR A HORACE CATH INSERTION.


-DR. HIGUERA WILL SEND PAIN MEDICINE TO YOUR PHARMACY.


-PER DR. CARVER'S REQUEST, THE FOLLOWING MEDICATIONS HAVE ALSO BEEN 

PRESCRIBED TO YOU AND SENT TO YOUR PHARMACY:


 1) GABAPENTIN 100 MG---TAKE ONE TABLET ONCE A DAY.


 2) AVELOX 400 MG---TAKE ONE TABLET ONCE A DAY FOR 5 DAYS.


 3) PROMETHAZINE DM---TAKE 5 ML BY MOUTH EVERY 6 HOURS ONLY IF YOU HAVE COUGH 

AND CONGESTION.


 4) PREDNISONE (STEROID FOR YOUR BREATHING)---TAKE EXACTLY AS PRESCRIBED; EACH 

DAY THE DOSE WILL CHANGE; FOLLOW DIRECTIONS PROVIDED 


     WITH THE PRESCRIPTION BOTTLE UNTIL YOU HAVE COMPLETED THE MEDICATION. 


-MAKE SURE YOU TAKE THE COPIES OF YOUR TESTS TO [facility] FOR YOUR 

APPOINTMENT ON THURSDAY AT 4:00 PM.


-FOR FURTHER CONCERNS OR QUESTIONS, CONTACT DR. HIGUERA'S OFFICE. 





Objective





- Vital Signs/Intake and Output


Vital Signs (last 24 hours): 


 











Temp Pulse Resp BP Pulse Ox


 


 97.5 F L  108 H  18   95/56 L  100 


 


 07/23/18 07:00  07/23/18 09:00  07/23/18 07:00  07/23/18 07:00  07/23/18 07:00








Intake and Output: 


 











 07/23/18 07/23/18





 06:59 18:59


 


Intake Total 500 


 


Output Total 420 


 


Balance 80 














- Medications


Medications: 


 Current Medications





Acetaminophen (Tylenol 325mg Tab)  650 mg PO Q6 PRN


   PRN Reason: Pain, moderate (4-7)


   Last Admin: 07/20/18 19:21 Dose:  650 mg


Acetylcysteine (Acetylcysteine 20%)  4 ml INH RQ6 [facility]


   Last Admin: 07/23/18 14:00 Dose:  Not Given


Albuterol Sulfate (Albuterol 0.083% Inhal Sol (2.5 Mg/3 Ml) Ud)  2.5 mg INH RQ4 

[facility]


   Last Admin: 07/23/18 15:48 Dose:  2.5 mg


Bisacodyl (Dulcolax)  10 mg [facility] DAILY PRN


   PRN Reason: Constipation


Docusate Sodium (Colace)  200 mg PO AC [facility]


   Last Admin: 07/23/18 13:04 Dose:  200 mg


Enoxaparin Sodium (Lovenox)  30 mg SC 1000,2200 [facility]


   Last Admin: 07/23/18 09:10 Dose:  30 mg


Ceftriaxone Sodium 1 gm/ (Sodium Chloride)  100 mls @ 100 mls/hr IVPB Q12H [facility]


   PRN Reason: Protocol


   Last Admin: 07/23/18 09:18 Dose:  100 mls/hr


Methylprednisolone (Solu-Medrol)  40 mg IVP BID [facility]


   Last Admin: 07/23/18 09:04 Dose:  40 mg


Morphine Sulfate (Morphine)  2 mg IVP Q3H PRN


   PRN Reason: for moderate to severe pain


   Last Admin: 07/23/18 08:39 Dose:  2 mg


Morphine Sulfate (Morphine Immediate Release Tab)  15 mg PO Q6 PRN


   PRN Reason: Pain, severe (8-10)


   Last Admin: 07/22/18 19:32 Dose:  15 mg


Promethazine HCl/Codeine (Phenergan/Codeine Oral Syrup)  5 ml PO Q4 PRN


   PRN Reason: cns depression


   Last Admin: 07/22/18 22:09 Dose:  5 ml


Promethazine HCl/Dextromethorphan (Phenergan Dm Syrup)  5 ml PO Q6H [facility]


   Last Admin: 07/23/18 13:04 Dose:  5 ml


Zolpidem Tartrate (Ambien)  5 mg PO HS PRN


   PRN Reason: Insomnia


   Last Admin: 07/22/18 22:09 Dose:  5 mg











- Labs


Labs: 


 





 07/23/18 06:32 





 07/23/18 06:32 





 











PT  12.0 SECONDS (9.7-12.2)   07/17/18  21:19    


 


INR  1.1   07/17/18  21:19    


 


APTT  29 SECONDS (21-34)   07/17/18  21:19

## 2018-07-23 NOTE — RAD
Date of service: 



07/23/2018



HISTORY:



Status post right and left chest tubes removal 



COMPARISON:



7/23/2018 



FINDINGS:



LUNGS:



There is no pneumothorax following chest tube removal. Pleural and 

parenchymal opacities are stable when compared to chest x-ray done 

earlier today. 



PLEURA:



Small right pleural effusion



CARDIOVASCULAR:



Normal.



OSSEOUS STRUCTURES:



No significant abnormalities.



VISUALIZED UPPER ABDOMEN:



Normal.



OTHER FINDINGS:



None.



IMPRESSION:



No pneumothorax following chest tube removal.

## 2018-07-23 NOTE — CP.PCM.PN
Subjective





- Date & Time of Evaluation


Date of Evaluation: 07/23/18


Time of Evaluation: 17:14





- Subjective


Subjective: 





Patient chest tubes were removed this morning.


She is feeling much better.


No pain noted.


Mild tachypnea noted.


Room air oxygen saturation is 92%.





On examination:


Vital signs stable.


Chest bilateral good air entry.


Regular heart sound.


Nontender abdomen





Chest x-ray showing no evidence of any pneumothorax





Labs reviewed


Assessment and recommendation:


47-year-old female admitted with malignant pleural effusion.


Metastatic breast cancer.


Currently status post a chest tube drainage.


Clinically stable.


She will get the home oxygen, she will be discharged home today by PMD, she 

will follow-up with her oncologist.





Objective





- Vital Signs/Intake and Output


Vital Signs (last 24 hours): 


 











Temp Pulse Resp BP Pulse Ox


 


 97.5 F L  108 H  18   95/56 L  100 


 


 07/23/18 07:00  07/23/18 09:00  07/23/18 07:00  07/23/18 07:00  07/23/18 07:00








Intake and Output: 


 











 07/23/18 07/23/18





 06:59 18:59


 


Intake Total 500 


 


Output Total 420 


 


Balance 80 














- Labs


Labs: 


 





 07/23/18 06:32 





 07/23/18 06:32 





 











PT  12.0 SECONDS (9.7-12.2)   07/17/18  21:19    


 


INR  1.1   07/17/18  21:19    


 


APTT  29 SECONDS (21-34)   07/17/18  21:19

## 2018-07-23 NOTE — US
PROCEDURE:  Date of procedure: 07/18/20168



Procedure:



1. Placement of right and left chest tube with ultrasound guidance







Medications: 8 cc 1 percent lidocaine. 







HISTORY:

 Loculated left and right pleural effusion 



TECHNIQUE:

 Following informed consent and procedure time-out, the patient's 

right and left anterior chest was marked, prepped and draped in the 

usual sterile fashion. Ultrasound showed a loculated left pleural 

effusion and a loculated right pleural effusion.  After the skin was 

anesthetized with 1% lidocaine and, a [place] catheter was advanced 

under ultrasound guidance into the left pleural space. The catheter 

was exchanged over an 035 guidewire and tract was dilated to 

accommodate a 8.5 [place] pigtail catheter formed within the pleural 

space.  There is return of serosanguinous fluid. The catheter was 

secured to patient's skin. A xeroform dressing was applied. The 

catheter was then attached to a pleurovac. 



Following placement of left pleural drainage catheter, a right 

pleural drainage catheter was placed. A [place] catheter was advanced 

under ultrasound guidance into the right pleural space. The catheter 

was exchanged over an 035 guidewire and tract was dilated to 

accommodate a 8.5 [place] pigtail catheter formed within the pleural 

space.  There is return of serosanguinous fluid. The catheter was 

secured to patient's skin. A xeroform dressing was applied. The 

catheter was then attached to a pleurovac. 



IMPRESSION:

 Placement of an 8.5 [place] left and right pleural drainage catheter. 

There were no immediate complications.

## 2018-07-23 NOTE — RAD
Date of service: 



07/23/2018



HISTORY:

pna  



COMPARISON:

7/21/2018 



FINDINGS:



LUNGS:

Left upper lobe/at the opacity has cleared extensively.  Right 

basilar opacity persists.  Linear opacity mid right lung possibly 

fluid within minor fissure.



PLEURA:

Small right pleural effusion.  No left pleural effusion.  No 

pneumothorax.



CARDIOVASCULAR:

Normal.



OSSEOUS STRUCTURES:

No significant abnormalities.



VISUALIZED UPPER ABDOMEN:

Normal.



OTHER FINDINGS:

None.



IMPRESSION:

Persistent right basilar opacity.  Small left pleural effusion. 

Extensive clearing of left upper lobe/apical opacity.

## 2025-03-28 NOTE — CP.PCM.PN
History and physical    Assessment:  Multiple scalp cyst    Plan:  Patient is here for elective excision of multiple scalp cyst, at least 5.  I discussed the operative procedure, risk, benefits and alternatives, but not limited to bleeding, infection after surgery, recurrence, injury to adjacent organs, need for furher operations, loss of time from work, the patient understood and agreed to proceed with the above surgery.    History of Present Illness   Xiomara Parmar is a 59 y.o. female who presents to the hospital for elective excision of multiple scalp cyst under general anesthesia.  She still complains of pain and discomfort from them.  Patient was originally seen in my office on , see consultation for details.    Review of Systems  The rest of the review of system total of 10 were negative except for the HPI.    Historical Information   Past Medical History:   Diagnosis Date    Allergic unknown    seasonal    Ear problems     Fatty liver     H/O seasonal allergies     Hyperlipidemia     Hypertension     Visual impairment     wears glasses     Past Surgical History:   Procedure Laterality Date    BREAST BIOPSY Left 2017    COLONOSCOPY      ENDOMETRIAL ABLATION      MOUTH SURGERY  2017    Tooth extraction [facility]    SCALP EXCISION N/A 10/06/2023    Procedure: EXCISION LESION/MASS SCALP;  Surgeon: Robert Stark MD;  Location: [facility];  Service: General     Social History     Tobacco Use    Smoking status: Former     Current packs/day: 0.00     Average packs/day: 1 pack/day for 18.7 years (18.7 ttl pk-yrs)     Types: Cigarettes     Start date:      Quit date: 1996     Years since quittin.5    Smokeless tobacco: Never   Vaping Use    Vaping status: Never Used   Substance and Sexual Activity    Alcohol use: Yes     Comment: 1 monthly socially    Drug use: Never    Sexual activity: Not Currently     Partners: Male     Birth control/protection: Male Sterilization  Subjective





- Date & Time of Evaluation


Date of Evaluation: 07/22/18


Time of Evaluation: 21:42





- Subjective


Subjective: 





She now feeling slightly better than yesterday.


She is able to cough and able to produce the mucus is better than yesterday.


Less wheezing and less stress noted.


Mildly tachypnea noted.


Vital signs otherwise stable





On examination:


Chest bilateral good air entry, but minimal expiratory wheezing present.


Mildly tachycardia noted.


Regular heart sound


Abdomen soft nontender


No pedal edema





Today no labs noted





Spoke to the patient's family with the kids


Assessment and recommendation:


47-year-old female with metastatic breast cancer with bilateral pleural 

effusion.


Metastasis involving the lungs.


Left upper lung haziness noted, posteriorly mucus plugging noted.


Currently better.


We will repeat the x-ray tomorrow.


We will remove the chest tubes tomorrow. And after that the possibly discharge. 

Will follow-up the patient





Objective





- Vital Signs/Intake and Output


Vital Signs (last 24 hours): 


 











Temp Pulse Resp BP Pulse Ox


 


 98.3 F   111 H  20   104/67   99 


 


 07/22/18 16:00  07/22/18 16:00  07/22/18 16:00  07/22/18 16:00  07/22/18 16:00








Intake and Output: 


 











 07/22/18 07/23/18





 18:59 06:59


 


Output Total 170 


 


Balance -170 














- Medications


Medications: 


 Current Medications





Acetaminophen (Tylenol 325mg Tab)  650 mg PO Q6 PRN


   PRN Reason: Pain, moderate (4-7)


   Last Admin: 07/20/18 19:21 Dose:  650 mg


Acetylcysteine (Acetylcysteine 20%)  4 ml INH RQ6 [facility]


   Last Admin: 07/22/18 19:53 Dose:  4 ml


Albuterol Sulfate (Albuterol 0.083% Inhal Sol (2.5 Mg/3 Ml) Ud)  2.5 mg INH RQ4 

[facility]


   Last Admin: 07/22/18 19:53 Dose:  2.5 mg


Bisacodyl (Dulcolax)  10 mg LA DAILY PRN


   PRN Reason: Constipation


Docusate Sodium (Colace)  200 mg PO AC [facility]


   Last Admin: 07/22/18 17:08 Dose:  200 mg


Enoxaparin Sodium (Lovenox)  30 mg SC 1000,2200 [facility]


   Last Admin: 07/22/18 09:37 Dose:  30 mg


Ceftriaxone Sodium 1 gm/ (Sodium Chloride)  100 mls @ 100 mls/hr IVPB Q12H [facility]


   PRN Reason: Protocol


   Last Admin: 07/22/18 09:35 Dose:  100 mls/hr


Methylprednisolone (Solu-Medrol)  40 mg IVP BID [facility]


   Last Admin: 07/22/18 17:08 Dose:  40 mg


Morphine Sulfate (Morphine)  2 mg IVP Q3H PRN


   PRN Reason: for moderate to severe pain


   Last Admin: 07/22/18 00:20 Dose:  2 mg


Morphine Sulfate (Morphine Immediate Release Tab)  15 mg PO Q6 PRN


   PRN Reason: Pain, severe (8-10)


   Last Admin: 07/22/18 19:32 Dose:  15 mg


Promethazine HCl/Codeine (Phenergan/Codeine Oral Syrup)  5 ml PO Q4 PRN


   PRN Reason: cns depression


   Last Admin: 07/22/18 09:34 Dose:  5 ml


Promethazine HCl/Dextromethorphan (Phenergan Dm Syrup)  5 ml PO Q6H [facility]


   Last Admin: 07/22/18 19:51 Dose:  Not Given


Zolpidem Tartrate (Ambien)  5 mg PO HS PRN


   PRN Reason: Insomnia


   Last Admin: 07/21/18 21:04 Dose:  5 mg











- Labs


Labs: 


 





 07/20/18 07:04 





 07/20/18 07:04 





 











PT  12.0 SECONDS (9.7-12.2)   07/17/18  21:19    


 


INR  1.1   07/17/18  21:19    


 


APTT  29 SECONDS (21-34)   07/17/18  21:19     E-Cigarette/Vaping    E-Cigarette Use Never User      E-Cigarette/Vaping Substances    Nicotine No     THC No     CBD No     Flavoring No     Other No     Unknown No      Family history non-contributory  Social History     Tobacco Use    Smoking status: Former     Current packs/day: 0.00     Average packs/day: 1 pack/day for 18.7 years (18.7 ttl pk-yrs)     Types: Cigarettes     Start date:      Quit date: 1996     Years since quittin.5    Smokeless tobacco: Never   Vaping Use    Vaping status: Never Used   Substance and Sexual Activity    Alcohol use: Yes     Comment: 1 monthly socially    Drug use: Never    Sexual activity: Not Currently     Partners: Male     Birth control/protection: Male Sterilization       Current Facility-Administered Medications:     ceFAZolin (ANCEF) IVPB (premix in dextrose) 2,000 mg 50 mL, Once    lactated ringers infusion, Continuous  Prior to Admission Medications   Prescriptions Last Dose Informant Patient Reported? Taking?   Multiple Vitamin (multivitamin) capsule Past Week Self Yes Yes   Sig: Take 1 capsule by mouth daily   Omega-3 Fatty Acids (Fish Oil Burp-Less) 1000 MG CAPS Past Week Self No Yes   Sig: Take 4 capsules (4,000 mg total) by mouth 2 (two) times a day   fenofibrate micronized (LOFIBRA) 134 MG capsule 3/27/2025 Self No Yes   Sig: Take 1 capsule (134 mg total) by mouth daily with breakfast   fluticasone (FLONASE) 50 mcg/act nasal spray  Self Yes Yes   Sig: SPRAY 2 SPRAYS INTO EACH NOSTRIL EVERY DAY   hydroCHLOROthiazide 12.5 mg tablet 3/27/2025 Self No Yes   Sig: TAKE 1 TABLET BY MOUTH EVERY DAY   loratadine (CLARITIN) 10 mg tablet  Self Yes Yes   Sig: Take 10 mg by mouth daily   losartan (COZAAR) 25 mg tablet 3/27/2025 Self No Yes   Sig: TAKE 1 TABLET BY MOUTH EVERY DAY      Facility-Administered Medications: None     Adhesive  [medical tape] and Latex    Objective :  Temp:  [97.1 °F (36.2 °C)] 97.1 °F (36.2 °C)  HR:  [90] 90  BP: (137)/(86)  "137/86  Resp:  [18] 18  SpO2:  [97 %] 97 %  O2 Device: None (Room air)      Physical Exam  Vitals and nursing note reviewed.   Constitutional:       General: She is not in acute distress.  HENT:      Head:      Comments: There are total of 5 scalp cyst, ranging in size from 0.5 cm to 1 cm.  Cardiovascular:      Rate and Rhythm: Normal rate and regular rhythm.   Pulmonary:      Effort: No respiratory distress.      Breath sounds: Normal breath sounds.   Abdominal:      Palpations: Abdomen is soft. There is no mass.      Tenderness: There is no abdominal tenderness.   Skin:     General: Skin is warm.      Coloration: Skin is not jaundiced.      Findings: No erythema or rash.   Neurological:      Mental Status: She is alert and oriented to person, place, and time.      Cranial Nerves: No cranial nerve deficit.   Psychiatric:         Mood and Affect: Mood normal.         Behavior: Behavior normal.         Lab Results: I have reviewed the following results:  No results for input(s): \"WBC\", \"HGB\", \"HCT\", \"PLT\", \"BANDSPCT\", \"SODIUM\", \"K\", \"CL\", \"CO2\", \"BUN\", \"CREATININE\", \"GLUC\", \"CAIONIZED\", \"MG\", \"PHOS\", \"AST\", \"ALT\", \"ALB\", \"TBILI\", \"DBILI\", \"ALKPHOS\", \"PTT\", \"INR\", \"HSTNI0\", \"HSTNI2\", \"BNP\", \"LACTICACID\" in the last 72 hours.    VTE Pharmacologic Prophylaxis: Heparin  VTE Mechanical Prophylaxis: sequential compression device    "